# Patient Record
Sex: FEMALE
[De-identification: names, ages, dates, MRNs, and addresses within clinical notes are randomized per-mention and may not be internally consistent; named-entity substitution may affect disease eponyms.]

---

## 2022-03-15 ENCOUNTER — TRANSCRIPTION ENCOUNTER (OUTPATIENT)
Age: 37
End: 2022-03-15

## 2022-07-07 ENCOUNTER — NON-APPOINTMENT (OUTPATIENT)
Age: 37
End: 2022-07-07

## 2022-07-14 ENCOUNTER — EMERGENCY (EMERGENCY)
Facility: HOSPITAL | Age: 37
LOS: 1 days | Discharge: HOME | End: 2022-07-14
Attending: EMERGENCY MEDICINE
Payer: COMMERCIAL

## 2022-07-14 VITALS
TEMPERATURE: 98 F | RESPIRATION RATE: 18 BRPM | OXYGEN SATURATION: 99 % | WEIGHT: 220.02 LBS | SYSTOLIC BLOOD PRESSURE: 112 MMHG | HEART RATE: 86 BPM | DIASTOLIC BLOOD PRESSURE: 80 MMHG

## 2022-07-14 VITALS
HEART RATE: 87 BPM | TEMPERATURE: 98 F | RESPIRATION RATE: 17 BRPM | DIASTOLIC BLOOD PRESSURE: 69 MMHG | SYSTOLIC BLOOD PRESSURE: 101 MMHG | OXYGEN SATURATION: 99 %

## 2022-07-14 LAB
ALBUMIN SERPL ELPH-MCNC: 3.4 G/DL — LOW (ref 3.5–5)
ALP SERPL-CCNC: 102 U/L — SIGNIFICANT CHANGE UP (ref 40–120)
ALT FLD-CCNC: 43 U/L DA — SIGNIFICANT CHANGE UP (ref 10–60)
ANION GAP SERPL CALC-SCNC: 9 MMOL/L — SIGNIFICANT CHANGE UP (ref 5–17)
APPEARANCE UR: CLEAR — SIGNIFICANT CHANGE UP
APTT BLD: 33.9 SEC — SIGNIFICANT CHANGE UP (ref 27.5–35.5)
AST SERPL-CCNC: 44 U/L — HIGH (ref 10–40)
BASOPHILS # BLD AUTO: 0.05 K/UL — SIGNIFICANT CHANGE UP (ref 0–0.2)
BASOPHILS NFR BLD AUTO: 0.4 % — SIGNIFICANT CHANGE UP (ref 0–2)
BILIRUB SERPL-MCNC: 0.4 MG/DL — SIGNIFICANT CHANGE UP (ref 0.2–1.2)
BILIRUB UR-MCNC: NEGATIVE — SIGNIFICANT CHANGE UP
BUN SERPL-MCNC: 12 MG/DL — SIGNIFICANT CHANGE UP (ref 7–18)
CALCIUM SERPL-MCNC: 9 MG/DL — SIGNIFICANT CHANGE UP (ref 8.4–10.5)
CHLORIDE SERPL-SCNC: 106 MMOL/L — SIGNIFICANT CHANGE UP (ref 96–108)
CO2 SERPL-SCNC: 22 MMOL/L — SIGNIFICANT CHANGE UP (ref 22–31)
COLOR SPEC: YELLOW — SIGNIFICANT CHANGE UP
CREAT SERPL-MCNC: 0.77 MG/DL — SIGNIFICANT CHANGE UP (ref 0.5–1.3)
DIFF PNL FLD: NEGATIVE — SIGNIFICANT CHANGE UP
EGFR: 101 ML/MIN/1.73M2 — SIGNIFICANT CHANGE UP
EOSINOPHIL # BLD AUTO: 0.84 K/UL — HIGH (ref 0–0.5)
EOSINOPHIL NFR BLD AUTO: 7 % — HIGH (ref 0–6)
GLUCOSE SERPL-MCNC: 111 MG/DL — HIGH (ref 70–99)
GLUCOSE UR QL: NEGATIVE — SIGNIFICANT CHANGE UP
HCG SERPL-ACNC: <1 MIU/ML — SIGNIFICANT CHANGE UP
HCG UR QL: NEGATIVE — SIGNIFICANT CHANGE UP
HCT VFR BLD CALC: 40.4 % — SIGNIFICANT CHANGE UP (ref 34.5–45)
HGB BLD-MCNC: 13.5 G/DL — SIGNIFICANT CHANGE UP (ref 11.5–15.5)
IMM GRANULOCYTES NFR BLD AUTO: 0.4 % — SIGNIFICANT CHANGE UP (ref 0–1.5)
INR BLD: 1.02 RATIO — SIGNIFICANT CHANGE UP (ref 0.88–1.16)
KETONES UR-MCNC: NEGATIVE — SIGNIFICANT CHANGE UP
LEUKOCYTE ESTERASE UR-ACNC: ABNORMAL
LIDOCAIN IGE QN: 118 U/L — SIGNIFICANT CHANGE UP (ref 73–393)
LYMPHOCYTES # BLD AUTO: 16.6 % — SIGNIFICANT CHANGE UP (ref 13–44)
LYMPHOCYTES # BLD AUTO: 2 K/UL — SIGNIFICANT CHANGE UP (ref 1–3.3)
MCHC RBC-ENTMCNC: 28 PG — SIGNIFICANT CHANGE UP (ref 27–34)
MCHC RBC-ENTMCNC: 33.4 GM/DL — SIGNIFICANT CHANGE UP (ref 32–36)
MCV RBC AUTO: 83.8 FL — SIGNIFICANT CHANGE UP (ref 80–100)
MONOCYTES # BLD AUTO: 0.67 K/UL — SIGNIFICANT CHANGE UP (ref 0–0.9)
MONOCYTES NFR BLD AUTO: 5.6 % — SIGNIFICANT CHANGE UP (ref 2–14)
NEUTROPHILS # BLD AUTO: 8.43 K/UL — HIGH (ref 1.8–7.4)
NEUTROPHILS NFR BLD AUTO: 70 % — SIGNIFICANT CHANGE UP (ref 43–77)
NITRITE UR-MCNC: NEGATIVE — SIGNIFICANT CHANGE UP
NRBC # BLD: 0 /100 WBCS — SIGNIFICANT CHANGE UP (ref 0–0)
PH UR: 6 — SIGNIFICANT CHANGE UP (ref 5–8)
PLATELET # BLD AUTO: 236 K/UL — SIGNIFICANT CHANGE UP (ref 150–400)
POTASSIUM SERPL-MCNC: 4.5 MMOL/L — SIGNIFICANT CHANGE UP (ref 3.5–5.3)
POTASSIUM SERPL-SCNC: 4.5 MMOL/L — SIGNIFICANT CHANGE UP (ref 3.5–5.3)
PROT SERPL-MCNC: 7.7 G/DL — SIGNIFICANT CHANGE UP (ref 6–8.3)
PROT UR-MCNC: 30 MG/DL
PROTHROM AB SERPL-ACNC: 12.1 SEC — SIGNIFICANT CHANGE UP (ref 10.5–13.4)
RBC # BLD: 4.82 M/UL — SIGNIFICANT CHANGE UP (ref 3.8–5.2)
RBC # FLD: 14 % — SIGNIFICANT CHANGE UP (ref 10.3–14.5)
SODIUM SERPL-SCNC: 137 MMOL/L — SIGNIFICANT CHANGE UP (ref 135–145)
SP GR SPEC: 1.02 — SIGNIFICANT CHANGE UP (ref 1.01–1.02)
UROBILINOGEN FLD QL: NEGATIVE — SIGNIFICANT CHANGE UP
WBC # BLD: 12.04 K/UL — HIGH (ref 3.8–10.5)
WBC # FLD AUTO: 12.04 K/UL — HIGH (ref 3.8–10.5)

## 2022-07-14 PROCEDURE — 81001 URINALYSIS AUTO W/SCOPE: CPT

## 2022-07-14 PROCEDURE — 87086 URINE CULTURE/COLONY COUNT: CPT

## 2022-07-14 PROCEDURE — 85730 THROMBOPLASTIN TIME PARTIAL: CPT

## 2022-07-14 PROCEDURE — 84702 CHORIONIC GONADOTROPIN TEST: CPT

## 2022-07-14 PROCEDURE — 80053 COMPREHEN METABOLIC PANEL: CPT

## 2022-07-14 PROCEDURE — 85610 PROTHROMBIN TIME: CPT

## 2022-07-14 PROCEDURE — 96374 THER/PROPH/DIAG INJ IV PUSH: CPT

## 2022-07-14 PROCEDURE — 81025 URINE PREGNANCY TEST: CPT

## 2022-07-14 PROCEDURE — 83690 ASSAY OF LIPASE: CPT

## 2022-07-14 PROCEDURE — 85025 COMPLETE CBC W/AUTO DIFF WBC: CPT

## 2022-07-14 PROCEDURE — 99284 EMERGENCY DEPT VISIT MOD MDM: CPT | Mod: 25

## 2022-07-14 PROCEDURE — 99284 EMERGENCY DEPT VISIT MOD MDM: CPT

## 2022-07-14 PROCEDURE — 36415 COLL VENOUS BLD VENIPUNCTURE: CPT

## 2022-07-14 RX ORDER — LEVOFLOXACIN 5 MG/ML
1 INJECTION, SOLUTION INTRAVENOUS
Qty: 5 | Refills: 0
Start: 2022-07-14 | End: 2022-07-18

## 2022-07-14 RX ORDER — KETOROLAC TROMETHAMINE 30 MG/ML
30 SYRINGE (ML) INJECTION ONCE
Refills: 0 | Status: DISCONTINUED | OUTPATIENT
Start: 2022-07-14 | End: 2022-07-14

## 2022-07-14 RX ADMIN — Medication 30 MILLIGRAM(S): at 10:40

## 2022-07-14 RX ADMIN — Medication 30 MILLIGRAM(S): at 10:13

## 2022-07-14 NOTE — ED PROVIDER NOTE - OBJECTIVE STATEMENT
A 37 year old female with no pertinent PHMx presents to the ED with complaints of left flank pain from last night. Patient states she had pain in the right abdominal pain a few days ago. She states when she went to Urgent Care, she was told her urine was not normal. Patient was not given any pain medications. Patient denies any nausea, vomiting, diarrhea, urinary frequency and dysuria.   Allergies: Penicillins (Unknown)

## 2022-07-14 NOTE — ED ADULT NURSE NOTE - MUSCULOSKELETAL ASSESSMENT
CM CALLED TO ASSESS AND PLACE PT TO POSS STR  PT FELL AND HAS A WB FRACTURE, CAN NOT BE HOME ALONE  CM SPOKE WITH PT, SHE IS UNABLE TO GO HOME ALONE, IS WILLING TO GO TO STR AND POSS APPLY FOR MEDICAID IF NEEDED  CALL MADE TO [facility], THEY HAVE NO BED BUT WILLING TO ACCEPT THE REFERRAL  CALL MADE TO [facility], WILL REVIEW THE REQUEST AND ASSESS FOR BED AVAILABILITY  [facility]OUGH [facility], AWAITING REPLY  REFERRALS PLACED TO [facility] AND [facility] WITH PERMISSION OF THE PATIENT  KAL UNABLE TO PROVIDED A BED AT THIS TIME  [facility] HAS A BED AVAILABLE AT THE [facility]  PT WILL NEED TO APPLY FOR MEDICAID WHILE  [address]  PT IS AWARE OF THIS AND IS AGREEABLE IN ORDER TO CONTINUE TO RECEIVE CARE FOR THIS FRACTURE AND STRENGTHENING  CALL MADE TO THE PT DTR, EXPLAINED TO HER THE CURRENT DCP TO [facility]  EXPLAINED TO HER THAT SHE WILL NEED TO ASSIST HER MOM IN SIGNING INTO THE FACILITY TONIGHT AND THAT SHE WILL ALSO NEED TO ASSIST IN THE APPLICATION PROCESS FOR MEDICAID  DTR STATED THAT SHE IS AT WORK AND CAN STOP AT HER MOM'S HOUSE TONIGHT AND THEN COME TO THE FACILITY TO SIGN HER IN AT 6PM   WILL HAVE THE BUSINESS OFFICE CONTACT HER TO LET HER KNOW WHAT THEY WILL NEED FROM THE PT HOME   SCHEDULED WITH [facility] FOR WC  AT 2PM, GOING TO [facility] FOR STR  CM CALLED PT DTR, AWARE OF THE  TIME AND PROVIDED CONTACT INFORMATION FOR THE [facility] 
- - -

## 2022-07-14 NOTE — ED PROVIDER NOTE - NSFOLLOWUPINSTRUCTIONS_ED_ALL_ED_FT
Urinary Tract Infection in Women    WHAT YOU NEED TO KNOW:    A urinary tract infection (UTI) is caused by bacteria that get inside your urinary tract. Your urinary tract includes your kidneys, ureters, bladder, and urethra. A UTI is more common in your lower urinary tract, which includes your bladder and urethra.  Female Urinary System         DISCHARGE INSTRUCTIONS:    Seek care immediately if:   •You are urinating very little or not at all.      •You have a high fever with shaking chills.      •You have side or back pain that gets worse.      Call your doctor if:   •You have a fever.      •You do not feel better after 2 days of taking antibiotics.      •You have new symptoms, such as blood or pus in your urine.      •You are vomiting.      •You have questions or concerns about your condition or care.      Medicines:   •Antibiotics treat a bacterial infection. If you have UTIs often (called recurrent UTIs), you may be given antibiotics to take regularly. You will be given directions for when and how to use antibiotics. The goal is to prevent UTIs but not cause antibiotic resistance by using antibiotics too often.      •Medicines may be given to decrease pain and burning when you urinate. They will also help decrease the feeling that you need to urinate often. These medicines may make your urine orange or red.      •Take your medicine as directed. Contact your healthcare provider if you think your medicine is not helping or if you have side effects. Tell him or her if you are allergic to any medicine. Keep a list of the medicines, vitamins, and herbs you take. Include the amounts, and when and why you take them. Bring the list or the pill bottles to follow-up visits. Carry your medicine list with you in case of an emergency.      Follow up with your doctor as directed: Write down your questions so you remember to ask them during your visits.    Prevent another UTI:   •Empty your bladder often. Urinate and empty your bladder as soon as you feel the need. Do not hold your urine for long periods of time.      •Wipe from front to back after you urinate or have a bowel movement. This will help prevent germs from getting into your urinary tract through your urethra.      •Drink liquids as directed. Ask how much liquid to drink each day and which liquids are best for you. You may need to drink more liquids than usual to help flush out the bacteria. Do not drink alcohol, caffeine, or citrus juices. These can irritate your bladder and increase your symptoms. Your healthcare provider may recommend cranberry juice to help prevent a UTI.      •Urinate before and after you have sex. This can help flush out bacteria passed during sex.      •Do not douche or use feminine deodorants. These can change the chemical balance in your vagina.      •Change sanitary pads or tampons often. This will help prevent germs from getting into your urinary tract.       •Talk to your healthcare provider about your birth control method. You may need to change your method if it is increasing your risk for UTIs.      •Wear cotton underwear and clothes that are loose. Tight pants and nylon underwear can trap moisture and cause bacteria to grow.      •Vaginal estrogen may be recommended. This medicine helps prevent UTIs in women who have gone through menopause or are in alexy-menopause.      •Do pelvic muscle exercises often. Pelvic muscle exercises may help you start and stop urinating. Strong pelvic muscles may help you empty your bladder easier. Squeeze these muscles tightly for 5 seconds like you are trying to hold back urine. Then relax for 5 seconds. Gradually work up to squeezing for 10 seconds. Do 3 sets of 15 repetitions a day, or as directed.

## 2022-07-14 NOTE — ED ADULT NURSE NOTE - ISOLATION TYPE:
Primary Nurse Ricky Bull RN and Devyn Pabon RN performed a dual skin assessment on this patient Impairment noted- see wound doc flow sheet, left groin surgical incision site - dressing clean, dry, and intact  Miguel score is 16 None

## 2022-07-14 NOTE — ED PROVIDER NOTE - PATIENT PORTAL LINK FT
You can access the FollowMyHealth Patient Portal offered by [facility] by registering at the following website: [email or web address]. By joining [facility]’s FollowMyHealth portal, you will also be able to view your health information using other applications (apps) compatible with our system.

## 2022-07-14 NOTE — ED ADULT NURSE NOTE - OBJECTIVE STATEMENT
Pt c/o left back pain x couple of days, refuses  Symptoms/falls/injury. No acute distress noted, denies chest pain, no shortness of breath indicated.

## 2022-07-16 LAB
CULTURE RESULTS: SIGNIFICANT CHANGE UP
SPECIMEN SOURCE: SIGNIFICANT CHANGE UP

## 2022-10-18 PROBLEM — Z00.00 ENCOUNTER FOR PREVENTIVE HEALTH EXAMINATION: Status: ACTIVE | Noted: 2022-10-18

## 2022-10-25 ENCOUNTER — APPOINTMENT (OUTPATIENT)
Dept: OBGYN | Facility: CLINIC | Age: 37
End: 2022-10-25

## 2022-10-25 VITALS
BODY MASS INDEX: 45.16 KG/M2 | DIASTOLIC BLOOD PRESSURE: 77 MMHG | HEIGHT: 60 IN | SYSTOLIC BLOOD PRESSURE: 122 MMHG | WEIGHT: 230 LBS

## 2022-10-25 DIAGNOSIS — Z33.1 PREGNANT STATE, INCIDENTAL: ICD-10-CM

## 2022-10-25 DIAGNOSIS — O24.419 GESTATIONAL DIABETES MELLITUS IN PREGNANCY, UNSPECIFIED CONTROL: ICD-10-CM

## 2022-10-25 PROCEDURE — 99202 OFFICE O/P NEW SF 15 MIN: CPT

## 2022-10-25 NOTE — DISCUSSION/SUMMARY
[FreeTextEntry1] : -Discussed a possible abnormal NIPT due to vanishing twin\par -Advised pt to take baby ASA one day and two the next and continue to alternate\par -Recommended 24hr hr urine \par -GDM teaching done today by RN\par -RTO 3-4 weeks for GDM teaching \par

## 2022-10-25 NOTE — HISTORY OF PRESENT ILLNESS
[FreeTextEntry1] : 37 year old female presents for a consultation as she is high risk. LMP 7/18/22. Pt has hashimotos and early diagnosis of GDM. She is currently on Synthroid 175mcg daily. She is also currently on baby ASA once a day. She also states a vanishing twin in this pregnancy. She had NIPT done and still awaiting results. Her age related risk for chromosomal abnormalities is 1:80. The patient reports an unremarkable maternal and paternal family history and her pregnancy history is also noted to be unremarkable. \par

## 2022-11-30 ENCOUNTER — LABORATORY RESULT (OUTPATIENT)
Age: 37
End: 2022-11-30

## 2022-11-30 ENCOUNTER — APPOINTMENT (OUTPATIENT)
Dept: OBGYN | Facility: CLINIC | Age: 37
End: 2022-11-30
Payer: COMMERCIAL

## 2022-11-30 ENCOUNTER — APPOINTMENT (OUTPATIENT)
Dept: ANTEPARTUM | Facility: CLINIC | Age: 37
End: 2022-11-30

## 2022-11-30 VITALS — DIASTOLIC BLOOD PRESSURE: 82 MMHG | SYSTOLIC BLOOD PRESSURE: 130 MMHG | WEIGHT: 231 LBS | BODY MASS INDEX: 45.11 KG/M2

## 2022-11-30 DIAGNOSIS — O28.3 ABNORMAL ULTRASONIC FINDING ON ANTENATAL SCREENING OF MOTHER: ICD-10-CM

## 2022-11-30 PROCEDURE — 76820 UMBILICAL ARTERY ECHO: CPT

## 2022-11-30 PROCEDURE — 59000 AMNIOCENTESIS DIAGNOSTIC: CPT

## 2022-11-30 PROCEDURE — 76811 OB US DETAILED SNGL FETUS: CPT

## 2022-11-30 PROCEDURE — 99213 OFFICE O/P EST LOW 20 MIN: CPT

## 2022-11-30 PROCEDURE — 76946 ECHO GUIDE FOR AMNIOCENTESIS: CPT

## 2022-11-30 PROCEDURE — 76817 TRANSVAGINAL US OBSTETRIC: CPT

## 2022-12-01 ENCOUNTER — TRANSCRIPTION ENCOUNTER (OUTPATIENT)
Age: 37
End: 2022-12-01

## 2022-12-08 ENCOUNTER — OUTPATIENT (OUTPATIENT)
Dept: OUTPATIENT SERVICES | Age: 37
LOS: 1 days | Discharge: HOME | End: 2022-12-08

## 2022-12-09 ENCOUNTER — APPOINTMENT (OUTPATIENT)
Dept: PEDIATRIC CARDIOLOGY | Facility: CLINIC | Age: 37
End: 2022-12-09

## 2022-12-09 PROCEDURE — 76820 UMBILICAL ARTERY ECHO: CPT

## 2022-12-09 PROCEDURE — 76825 ECHO EXAM OF FETAL HEART: CPT

## 2022-12-09 PROCEDURE — 76821 MIDDLE CEREBRAL ARTERY ECHO: CPT

## 2022-12-09 PROCEDURE — 99202 OFFICE O/P NEW SF 15 MIN: CPT | Mod: 25

## 2022-12-09 PROCEDURE — 93325 DOPPLER ECHO COLOR FLOW MAPG: CPT | Mod: 59

## 2022-12-09 PROCEDURE — 76827 ECHO EXAM OF FETAL HEART: CPT

## 2022-12-14 ENCOUNTER — APPOINTMENT (OUTPATIENT)
Dept: OBGYN | Facility: CLINIC | Age: 37
End: 2022-12-14

## 2022-12-14 ENCOUNTER — APPOINTMENT (OUTPATIENT)
Dept: ANTEPARTUM | Facility: CLINIC | Age: 37
End: 2022-12-14

## 2022-12-14 VITALS — BODY MASS INDEX: 45.31 KG/M2 | SYSTOLIC BLOOD PRESSURE: 124 MMHG | WEIGHT: 232 LBS | DIASTOLIC BLOOD PRESSURE: 83 MMHG

## 2022-12-14 PROCEDURE — 76820 UMBILICAL ARTERY ECHO: CPT | Mod: 59

## 2022-12-14 PROCEDURE — 76816 OB US FOLLOW-UP PER FETUS: CPT

## 2022-12-14 PROCEDURE — ZZZZZ: CPT

## 2022-12-14 PROCEDURE — 76821 MIDDLE CEREBRAL ARTERY ECHO: CPT | Mod: 59

## 2022-12-14 NOTE — OB SUMMARY
[FreeTextEntry2] : DANIEL 4/24/23. \par Amnio done on 11/30/22 [Date: _____] : Date: [unfilled]  [Gestational Age: _____] : Gestational Age: [unfilled]  [FreeTextEntry1] : Anatomy scan done today. Sono done reveals fetus not growing, 2 week lag in growth. Placenta previa also noted. Discussed evidence of early onset severe IUGR. \par -recommended fetal echo (req given today)\par -advised pt to see endo \par -offered amnio which pt desires (amnio done today, see official procedure note)\par -f/u APLS BW req given today \par -pt to RTO 2 weeks for EFW  [FreeTextEntry9] : EFW done today. Severe onset IUGR- Wt 9 oz. +FHR. By measurements, approx 18w4d today. Dopplers done today. Absent end diastolic flow noted (see official sono report). Discussed amnio which was WNL. \par -discussed daily monitoring at hospital start of 24 weeks \par -RTO 2 weeks for EFW

## 2022-12-22 ENCOUNTER — TRANSCRIPTION ENCOUNTER (OUTPATIENT)
Age: 37
End: 2022-12-22

## 2022-12-22 ENCOUNTER — APPOINTMENT (OUTPATIENT)
Dept: ANTEPARTUM | Facility: CLINIC | Age: 37
End: 2022-12-22

## 2022-12-22 ENCOUNTER — ASOB RESULT (OUTPATIENT)
Age: 37
End: 2022-12-22

## 2022-12-22 PROCEDURE — 76811 OB US DETAILED SNGL FETUS: CPT

## 2022-12-22 PROCEDURE — 76817 TRANSVAGINAL US OBSTETRIC: CPT

## 2022-12-23 DIAGNOSIS — Z11.59 ENCOUNTER FOR SCREENING FOR OTHER VIRAL DISEASES: ICD-10-CM

## 2022-12-25 ENCOUNTER — NON-APPOINTMENT (OUTPATIENT)
Age: 37
End: 2022-12-25

## 2022-12-27 ENCOUNTER — TRANSCRIPTION ENCOUNTER (OUTPATIENT)
Age: 37
End: 2022-12-27

## 2022-12-27 ENCOUNTER — NON-APPOINTMENT (OUTPATIENT)
Age: 37
End: 2022-12-27

## 2022-12-27 ENCOUNTER — APPOINTMENT (OUTPATIENT)
Dept: OBGYN | Facility: CLINIC | Age: 37
End: 2022-12-27

## 2022-12-27 ENCOUNTER — OUTPATIENT (OUTPATIENT)
Dept: OUTPATIENT SERVICES | Facility: HOSPITAL | Age: 37
LOS: 1 days | End: 2022-12-27

## 2022-12-27 VITALS
DIASTOLIC BLOOD PRESSURE: 80 MMHG | OXYGEN SATURATION: 98 % | RESPIRATION RATE: 16 BRPM | TEMPERATURE: 98 F | SYSTOLIC BLOOD PRESSURE: 117 MMHG | HEART RATE: 73 BPM | HEIGHT: 60 IN | WEIGHT: 224.87 LBS

## 2022-12-27 VITALS
SYSTOLIC BLOOD PRESSURE: 124 MMHG | WEIGHT: 228 LBS | HEIGHT: 60 IN | BODY MASS INDEX: 44.76 KG/M2 | DIASTOLIC BLOOD PRESSURE: 84 MMHG

## 2022-12-27 DIAGNOSIS — E03.9 HYPOTHYROIDISM, UNSPECIFIED: ICD-10-CM

## 2022-12-27 DIAGNOSIS — O35.9XX0 MATERNAL CARE FOR (SUSPECTED) FETAL ABNORMALITY AND DAMAGE, UNSPECIFIED, NOT APPLICABLE OR UNSPECIFIED: ICD-10-CM

## 2022-12-27 DIAGNOSIS — Z98.890 OTHER SPECIFIED POSTPROCEDURAL STATES: Chronic | ICD-10-CM

## 2022-12-27 DIAGNOSIS — O35.8XX0 MATERNAL CARE FOR OTHER (SUSPECTED) FETAL ABNORMALITY AND DAMAGE, NOT APPLICABLE OR UNSPECIFIED: ICD-10-CM

## 2022-12-27 DIAGNOSIS — Z71.9 COUNSELING, UNSPECIFIED: ICD-10-CM

## 2022-12-27 DIAGNOSIS — Z90.49 ACQUIRED ABSENCE OF OTHER SPECIFIED PARTS OF DIGESTIVE TRACT: Chronic | ICD-10-CM

## 2022-12-27 DIAGNOSIS — E66.9 OBESITY, UNSPECIFIED: ICD-10-CM

## 2022-12-27 LAB
ANION GAP SERPL CALC-SCNC: 15 MMOL/L — HIGH (ref 7–14)
BLD GP AB SCN SERPL QL: NEGATIVE — SIGNIFICANT CHANGE UP
BUN SERPL-MCNC: 9 MG/DL — SIGNIFICANT CHANGE UP (ref 7–23)
CALCIUM SERPL-MCNC: 10.1 MG/DL — SIGNIFICANT CHANGE UP (ref 8.4–10.5)
CHLORIDE SERPL-SCNC: 102 MMOL/L — SIGNIFICANT CHANGE UP (ref 98–107)
CO2 SERPL-SCNC: 20 MMOL/L — LOW (ref 22–31)
CREAT SERPL-MCNC: 0.51 MG/DL — SIGNIFICANT CHANGE UP (ref 0.5–1.3)
EGFR: 122 ML/MIN/1.73M2 — SIGNIFICANT CHANGE UP
GLUCOSE SERPL-MCNC: 87 MG/DL — SIGNIFICANT CHANGE UP (ref 70–99)
HCT VFR BLD CALC: 35.8 % — SIGNIFICANT CHANGE UP (ref 34.5–45)
HGB BLD-MCNC: 11.9 G/DL — SIGNIFICANT CHANGE UP (ref 11.5–15.5)
MCHC RBC-ENTMCNC: 28.1 PG — SIGNIFICANT CHANGE UP (ref 27–34)
MCHC RBC-ENTMCNC: 33.2 GM/DL — SIGNIFICANT CHANGE UP (ref 32–36)
MCV RBC AUTO: 84.4 FL — SIGNIFICANT CHANGE UP (ref 80–100)
NRBC # BLD: 0 /100 WBCS — SIGNIFICANT CHANGE UP (ref 0–0)
NRBC # FLD: 0 K/UL — SIGNIFICANT CHANGE UP (ref 0–0)
PLATELET # BLD AUTO: 239 K/UL — SIGNIFICANT CHANGE UP (ref 150–400)
POTASSIUM SERPL-MCNC: 3.6 MMOL/L — SIGNIFICANT CHANGE UP (ref 3.5–5.3)
POTASSIUM SERPL-SCNC: 3.6 MMOL/L — SIGNIFICANT CHANGE UP (ref 3.5–5.3)
RBC # BLD: 4.24 M/UL — SIGNIFICANT CHANGE UP (ref 3.8–5.2)
RBC # FLD: 14.4 % — SIGNIFICANT CHANGE UP (ref 10.3–14.5)
RH IG SCN BLD-IMP: POSITIVE — SIGNIFICANT CHANGE UP
SODIUM SERPL-SCNC: 137 MMOL/L — SIGNIFICANT CHANGE UP (ref 135–145)
WBC # BLD: 10.16 K/UL — SIGNIFICANT CHANGE UP (ref 3.8–10.5)
WBC # FLD AUTO: 10.16 K/UL — SIGNIFICANT CHANGE UP (ref 3.8–10.5)

## 2022-12-27 PROCEDURE — S0190: CPT

## 2022-12-27 PROCEDURE — 76801 OB US < 14 WKS SINGLE FETUS: CPT

## 2022-12-27 PROCEDURE — 99204 OFFICE O/P NEW MOD 45 MIN: CPT | Mod: 25

## 2022-12-27 PROCEDURE — 59200 INSERT CERVICAL DILATOR: CPT

## 2022-12-27 RX ORDER — SODIUM CHLORIDE 9 MG/ML
1000 INJECTION, SOLUTION INTRAVENOUS
Refills: 0 | Status: DISCONTINUED | OUTPATIENT
Start: 2022-12-28 | End: 2023-01-11

## 2022-12-27 RX ORDER — IBUPROFEN 600 MG/1
600 TABLET ORAL
Qty: 20 | Refills: 0 | Status: ACTIVE | COMMUNITY
Start: 2022-12-27 | End: 1900-01-01

## 2022-12-27 RX ORDER — SODIUM CHLORIDE 9 MG/ML
3 INJECTION INTRAMUSCULAR; INTRAVENOUS; SUBCUTANEOUS EVERY 8 HOURS
Refills: 0 | Status: DISCONTINUED | OUTPATIENT
Start: 2022-12-28 | End: 2023-01-11

## 2022-12-27 NOTE — H&P PST ADULT - COMMENTS
Activity: walks dog daily, climbs stairs   METS: 5  Symptoms: denies loose teeth Cardiovascular Expenditure   Activity: walks dog daily, climbs stairs   METS: 5  Symptoms: None    Dentition: denies loose teeth or dentures

## 2022-12-27 NOTE — H&P PST ADULT - PROBLEM SELECTOR PLAN 1
preop for dilation and evacuation with ultrasound guidance on 12/28/22  preop instructions given, pt verbalized understanding  cbc, bmp, type pending  pt states preop COVID testing was done 12/26/22 preop for dilation and evacuation with ultrasound guidance on 12/28/22  preop instructions given, pt verbalized understanding  STAT labs cbc, bmp, type pending  pt states preop COVID testing was done 12/26/22  case discussed with Dr. Mohr

## 2022-12-27 NOTE — H&P PST ADULT - FEMALE-SPECIFIC SYMPTOMS
pt reports dilators inserted 1 hour. she is currently experiencing pelvic cramping/pelvic pain pt says dilators inserted 1 hour. she is currently experiencing pelvic cramping/pelvic pain

## 2022-12-27 NOTE — H&P PST ADULT - NSICDXPASTMEDICALHX_GEN_ALL_CORE_FT
PAST MEDICAL HISTORY:  Hypothyroidism     Maternal care for other suspected fetal abnormality or damage     Obesity     Organ donor

## 2022-12-27 NOTE — H&P PST ADULT - NSICDXFAMILYHX_GEN_ALL_CORE_FT
FAMILY HISTORY:  Father  Still living? Unknown  FH: cirrhosis, Age at diagnosis: Age Unknown    Mother  Still living? Unknown  FH: ovarian cancer, Age at diagnosis: Age Unknown

## 2022-12-27 NOTE — H&P PST ADULT - ENDOCRINE COMMENTS
h/o hypothyroidism- on synthroid daily. Pt states last dose change was 4 months ago. Pt states recent TSH 4 weeks ago WNL h/o hypothyroidism- on synthroid daily. Pt states last dose change was 4 months ago. She states recent TSH 4 weeks ago was WNL

## 2022-12-27 NOTE — H&P PST ADULT - PATIENT ON (OXYGEN DELIVERY METHOD)
I personally evaluated the patient. I reviewed the Resident’s or Physician Assistant’s note (as assigned above), and agree with the findings and plan except as documented in my note.
room air

## 2022-12-27 NOTE — PROCEDURE
[Transabdominal OB Sonogram] : Transabdominal OB Sonogram [Intrauterine Pregnancy] : intrauterine pregnancy [Yolk Sac] : yolk sac present [Fetal Heart] : fetal heart present [Current GA by Sonogram: ___ (wks)] : Current GA by Sonogram: [unfilled]Uwks [___ day(s)] : [unfilled] days [FreeTextEntry1] : Anterior fundal placenta\par BPD  4.85cm  20w5d\par HC  19.19cm  21w3d\par AC  16.33cm  21w3d\par FL  2.88cm  18w6d

## 2022-12-27 NOTE — H&P PST ADULT - ALLERGIC/IMMUNOLOGIC
NEW VISIT    Patient: Hannah Marion  ?  YOB: 1975    MRN: [id number]  ?  Chief Complaint   Patient presents with   • Right Knee - Pain   • Left Knee - Pain   • Establish Care      ?  HPI:   44 y.o. female presents with complaint of bilateral knee pain for the last 2 months.  She denies any specific injury to either knee.  She reports the left knee is more painful than the right.  Her history is remarkable for 2 meniscal surgeries on the right knee.  She is also previously received steroid injections by her primary care provider prior to that surgery.  She reports she is worked at [employer] for the last 30 years which has involved a lot of standing on concrete.    Pain Location: bilateral knee  Radiation: Into the medial aspect  Quality: aching, burning  Intensity/Severity: severe  Duration: 2 months with progressive worsening  Onset quality: gradual   Timing: constant  Aggravating Factors: going up and down stairs, sitting, rising after sitting, walking, standing  Alleviating Factors: Tylenol, steroid injection (intra-articular), ice  Previous Episodes: yes  Associated Symptoms: pain, swelling, clicking/popping  ADLs Affected: grooming/hygiene/toileting/personal care, ambulating, work related activities, recreational activities/sports    This patient is a new patient.  This problem is new to this examiner.      Allergies:   Allergies   Allergen Reactions   • Levaquin [Levofloxacin] Hives       Medications:   Home Medications:  Current Outpatient Medications on File Prior to Visit   Medication Sig   • amLODIPine (NORVASC) 10 MG tablet Take 10 mg by mouth Daily.   • atenolol-chlorthalidone (TENORETIC) 100-25 MG per tablet Take 1 tablet by mouth Daily.   • busPIRone (BUSPAR) 15 MG tablet Take 15 mg by mouth 2 (Two) Times a Day.   • cefdinir (OMNICEF) 300 MG capsule Take 300 mg by mouth 2 (Two) Times a Day.   • desvenlafaxine (PRISTIQ) 100 MG 24 hr tablet Take 100 mg by mouth Every Morning.  "  • fluconazole (DIFLUCAN) 150 MG tablet Take 150 mg by mouth Daily.   • folic acid (FOLVITE) 1 MG tablet Take 1,000 mcg by mouth Daily.   • gabapentin (NEURONTIN) 300 MG capsule Take 300 mg by mouth 3 (Three) Times a Day.   • GUAIATUSSIN -10 MG/5ML liquid TAKE 5 ML BY MOUTH EVERY 4 TO 6 HOURS   • levothyroxine (SYNTHROID, LEVOTHROID) 150 MCG tablet Take 150 mcg by mouth Daily.   • pantoprazole (PROTONIX) 40 MG EC tablet Take 40 mg by mouth Daily.   • TRI-SPRINTEC 0.18/0.215/0.25 MG-35 MCG per tablet Take 1 tablet by mouth Daily.     No current facility-administered medications on file prior to visit.      Current Medications:  Scheduled Meds:  PRN Meds:.    I have reviewed the patient's medical history in detail and updated the computerized patient record.  Review and summarization of old records include:    Past Medical History:   Diagnosis Date   • Hypothyroidism      Past Surgical History:   Procedure Laterality Date   • GALLBLADDER SURGERY     • KNEE SURGERY     • THYROIDECTOMY       Social History     Occupational History   • Not on file   Tobacco Use   • Smoking status: Never Smoker   • Smokeless tobacco: Never Used   Substance and Sexual Activity   • Alcohol use: Not on file   • Drug use: Never   • Sexual activity: Defer     Family History   Problem Relation Age of Onset   • Rheumatologic disease Mother    • Osteoarthritis Father          Review of Systems  Constitutional: Negative.  Negative for fever.   Eyes: Negative.    Respiratory: Negative.    Cardiovascular: Negative.    Endocrine: Negative.    Musculoskeletal: Positive for arthralgias, gait problem and joint swelling.   Skin: Negative.  Negative for rash and wound.   Allergic/Immunologic: Negative.    Neurological: Negative for numbness.   Hematological: Negative.    Psychiatric/Behavioral: Negative.         Wt Readings from Last 3 Encounters:   02/10/20 103 kg (226 lb 6.4 oz)     Ht Readings from Last 3 Encounters:   02/10/20 147.3 cm (58\") "     Body mass index is 47.32 kg/m².  Facility age limit for growth percentiles is 20 years.  Vitals:    02/10/20 1031   Temp: 98.3 °F (36.8 °C)         Physical Exam  Constitutional: Patient is oriented to person, place, and time. Appears well-developed and well-nourished.   HENT:   Head: Normocephalic and atraumatic.   Eyes: Conjunctivae and EOM are normal. Pupils are equal, round, and reactive to light.   Cardiovascular: Normal rate and intact distal pulses.   Pulmonary/Chest: Effort normal and breath sounds normal.   Musculoskeletal:   See detailed exam below   Neurological: Alert and oriented to person, place, and time. No sensory deficit. Coordination normal.   Skin: Skin is warm and dry. Capillary refill takes less than 2 seconds. No rash noted. No erythema.   Psychiatric: Patient has a normal mood and affect. Her behavior is normal. Judgment and thought content normal.   Nursing note and vitals reviewed.      Ortho Exam: Bilateral knees:  Positive patellar grind test with pain in the retropatellar region bilaterally.    Positive for high Q-angle with patella tracking laterally in high grades of flexion.   Skin and soft tissues are swollen, consistent with inflammatory changes of the patellofemoral joint.    Positive for significant tenderness over the medial patellofemoral ligaments.   Quadriceps mechanism is well preserved.   Range of motion-Extension to Flexion: 0-120 degrees.  Slightly positive for apprehension sign laterally.  Negative anterior and posterior drawer. No medial or lateral instability noted.   Dorsalis pedis and posterior tibial artery pulses are palpable. Common peroneal nerve function is well preserved.        Diagnostics:  XR - 1 Result   I have personally reviewed   Results for orders placed in visit on 02/10/20   XR KNEE 3 VW BILATERAL 2/10/2020    and my interpretation of the image is as follows:   Findings: Mild narrowing of the medial joint space and patellofemoral joint space.   There is evidence of subchondral sclerosis at the medial aspect bilaterally.  There is a laterally tracking patella bilaterally.  Bony lesion: no  Soft tissues: within normal limits  Joint spaces: decreased  Hardware appropriately positioned: not applicable  Prior studies available for comparison: no          Assessment:  Hannah was seen today for establish care, pain and pain.    Diagnoses and all orders for this visit:    Pain in both knees, unspecified chronicity  -     XR Knee 3 View Bilateral  -     CBC & Differential; Future  -     C-reactive Protein; Future  -     Rheumatoid Factor; Future  -     Sedimentation Rate; Future  -     Uric Acid; Future  -     Urinalysis With Microscopic - Urine, Clean Catch  -     Cancel: Sjogren's Antibody, Anti-SS-A / -SS-B; Future  -     Cancel: Nuclear Antigen Antibody, IFA; Future  -     Cancel: Anti-Smith Antibody; Future  -     Cancel: RNP Antibodies; Future  -     Cancel: Anti-DNA Antibody, Double-stranded; Future  -     Anti-DNA Antibody, Double-stranded; Future  -     Nuclear Antigen Antibody, IFA; Future  -     Anti-Smith Antibody; Future  -     RNP Antibodies; Future  -     Sjogren's Antibody, Anti-SS-A / -SS-B; Future  -     Comprehensive Metabolic Panel; Future    Patellofemoral arthralgia of both knees    Bilateral hand pain  -     CBC & Differential; Future  -     C-reactive Protein; Future  -     Rheumatoid Factor; Future  -     Sedimentation Rate; Future  -     Uric Acid; Future  -     Urinalysis With Microscopic - Urine, Clean Catch  -     Cancel: Sjogren's Antibody, Anti-SS-A / -SS-B; Future  -     Cancel: Nuclear Antigen Antibody, IFA; Future  -     Cancel: Anti-Smith Antibody; Future  -     Cancel: RNP Antibodies; Future  -     Cancel: Anti-DNA Antibody, Double-stranded; Future  -     Anti-DNA Antibody, Double-stranded; Future  -     Nuclear Antigen Antibody, IFA; Future  -     Anti-Smith Antibody; Future  -     RNP Antibodies; Future  -     Sjogren's  Antibody, Anti-SS-A / -SS-B; Future  -     Comprehensive Metabolic Panel; Future    Polyarthralgia  -     CBC & Differential; Future  -     C-reactive Protein; Future  -     Rheumatoid Factor; Future  -     Sedimentation Rate; Future  -     Uric Acid; Future  -     Urinalysis With Microscopic - Urine, Clean Catch  -     Cancel: Sjogren's Antibody, Anti-SS-A / -SS-B; Future  -     Cancel: Nuclear Antigen Antibody, IFA; Future  -     Cancel: Anti-Smith Antibody; Future  -     Cancel: RNP Antibodies; Future  -     Cancel: Anti-DNA Antibody, Double-stranded; Future  -     Anti-DNA Antibody, Double-stranded; Future  -     Nuclear Antigen Antibody, IFA; Future  -     Anti-Smith Antibody; Future  -     RNP Antibodies; Future  -     Sjogren's Antibody, Anti-SS-A / -SS-B; Future  -     Comprehensive Metabolic Panel; Future    Joint stiffness  -     CBC & Differential; Future  -     C-reactive Protein; Future  -     Rheumatoid Factor; Future  -     Sedimentation Rate; Future  -     Uric Acid; Future  -     Urinalysis With Microscopic - Urine, Clean Catch  -     Cancel: Sjogren's Antibody, Anti-SS-A / -SS-B; Future  -     Cancel: Nuclear Antigen Antibody, IFA; Future  -     Cancel: Anti-Smith Antibody; Future  -     Cancel: RNP Antibodies; Future  -     Cancel: Anti-DNA Antibody, Double-stranded; Future  -     Anti-DNA Antibody, Double-stranded; Future  -     Nuclear Antigen Antibody, IFA; Future  -     Anti-Smith Antibody; Future  -     RNP Antibodies; Future  -     Sjogren's Antibody, Anti-SS-A / -SS-B; Future  -     Comprehensive Metabolic Panel; Future    Other orders  -     Large Joint Arthrocentesis  -     Large Joint Arthrocentesis            Large Joint Arthrocentesis: R knee  Date/Time: 2/10/2020 11:00 AM  Consent given by: patient  Site marked: site marked  Timeout: Immediately prior to procedure a time out was called to verify the correct patient, procedure, equipment, support staff and site/side marked as required    Supporting Documentation  Indications: pain   Procedure Details  Location: knee - R knee  Preparation: Patient was prepped and draped in the usual sterile fashion  Needle size: 25 G  Approach: anteromedial  Medications administered: 80 mg methylPREDNISolone acetate 80 MG/ML; 2 mL lidocaine PF 1% 1 %  Patient tolerance: patient tolerated the procedure well with no immediate complications    Large Joint Arthrocentesis: L knee  Date/Time: 2/10/2020 11:00 AM  Consent given by: patient  Site marked: site marked  Timeout: Immediately prior to procedure a time out was called to verify the correct patient, procedure, equipment, support staff and site/side marked as required   Supporting Documentation  Indications: pain   Procedure Details  Location: knee - L knee  Preparation: Patient was prepped and draped in the usual sterile fashion  Needle size: 25 G  Approach: anteromedial  Medications administered: 80 mg methylPREDNISolone acetate 80 MG/ML; 2 mL lidocaine PF 1% 1 %  Patient tolerance: patient tolerated the procedure well with no immediate complications          ?    Plan    · Discussion between the patient and myself regarding her current symptoms, physical exam findings and imaging results.  The following treatment options were discussed:  · Intra-articular injection, patient would like to proceed with injections bilaterally. Intraarticular injection with steroid performed today in clinic given persistent pain and lack of response to conservative measures.  · Physical Therapy: I have recommended physical therapy to work on motion and strengthening.  She will work with the physical therapist at her place of employment.  · Discussed use of patellar tracking brace, which was fitted and given while in office.  · Discussed ordering an MRI but patient prefers to try more conservative treatment initially.  · Rest, ice, compression, and elevation (RICE) therapy  · Alternate OTC Ibuprofen and Tylenol as needed/if clinically  indicated  · Follow up in 4-6 weeks      Amos Quijano PA-C  Date of encounter: 02/10/2020     Electronically signed by Amos Quijano PA-C, 02/10/20, 1:05 PM.   details…

## 2022-12-27 NOTE — H&P PST ADULT - ASSESSMENT
38 y/o female who is 23 weeks pregnant presents to PST preop for dilation and evacuation with ultrasound guidance. Pt reports severe early onset IUGR, thick placenta and fetal doppler abnormalities.

## 2022-12-27 NOTE — ASU PATIENT PROFILE, ADULT - NSICDXPASTMEDICALHX_GEN_ALL_CORE_FT
PAST MEDICAL HISTORY:  Hypothyroidism     Maternal care for other suspected fetal abnormality or damage     Obesity     Organ donor liver

## 2022-12-27 NOTE — ASU PATIENT PROFILE, ADULT - FALL HARM RISK - UNIVERSAL INTERVENTIONS
Bed in lowest position, wheels locked, appropriate side rails in place/Call bell, personal items and telephone in reach/Instruct patient to call for assistance before getting out of bed or chair/Non-slip footwear when patient is out of bed/[place] to call system/Physically safe environment - no spills, clutter or unnecessary equipment/Purposeful Proactive Rounding/Room/bathroom lighting operational, light cord in reach

## 2022-12-27 NOTE — HISTORY OF PRESENT ILLNESS
[FreeTextEntry1] : 36 y/o  @23w1d (LMP 22) presents for consultation for termination of pregnancy due to severe early onset IUGR. \par \par Pt was seen for anatomy scan on  and was found to be lagging in size by 13 days, which is consistent with early onset growth restriction. Additionally, a bulky and thick placenta, placental surface hematoma and doppler abnormalities (absent UAD) were noted.Absent end diastolic flow in the umbilical artery and reversed A wave on ductus venosum. Pt is s/p genetic testing and amniocentesis on  and FISH results are normal.\par \par POB:\par : miscarriage at 8 wks\par \par PGYN: abnormal pap X1 4 years ago but normal since\par \par Occupation: Manager in [employer]\par \par Patient is vaccinated against COVID-19. \par \par \par

## 2022-12-27 NOTE — H&P PST ADULT - FEMALE REPRODUCTIVE COMMENT, PROFILE
preop dx of maternal care for feta abnormality. see HPI preop dx of maternal care for fetal abnormality. see HPI

## 2022-12-27 NOTE — H&P PST ADULT - MUSCULOSKELETAL
negative no calf tenderness/normal gait/strength 5/5 bilateral upper extremities/strength 5/5 bilateral lower extremities/back exam

## 2022-12-27 NOTE — H&P PST ADULT - HISTORY OF PRESENT ILLNESS
36 y/o female who is 23 weeks pregnant presents to PST preop for dilation and evacuation with ultrasound guidance. Pt reports severe early onset IUGR and thick placenta and doppler abnormalities.   36 y/o female who is 23 weeks pregnant presents to PST preop for dilation and evacuation with ultrasound guidance. Pt reports severe early onset IUGR, thick placenta and fetal doppler abnormalities.

## 2022-12-28 ENCOUNTER — RESULT REVIEW (OUTPATIENT)
Age: 37
End: 2022-12-28

## 2022-12-28 ENCOUNTER — OUTPATIENT (OUTPATIENT)
Dept: OUTPATIENT SERVICES | Facility: HOSPITAL | Age: 37
LOS: 1 days | Discharge: HOME | End: 2022-12-28
Payer: COMMERCIAL

## 2022-12-28 ENCOUNTER — APPOINTMENT (OUTPATIENT)
Dept: OBGYN | Facility: CLINIC | Age: 37
End: 2022-12-28

## 2022-12-28 ENCOUNTER — TRANSCRIPTION ENCOUNTER (OUTPATIENT)
Age: 37
End: 2022-12-28

## 2022-12-28 VITALS
OXYGEN SATURATION: 100 % | HEART RATE: 93 BPM | TEMPERATURE: 99 F | RESPIRATION RATE: 18 BRPM | HEIGHT: 60 IN | WEIGHT: 224.87 LBS | DIASTOLIC BLOOD PRESSURE: 77 MMHG | SYSTOLIC BLOOD PRESSURE: 112 MMHG

## 2022-12-28 VITALS
SYSTOLIC BLOOD PRESSURE: 110 MMHG | TEMPERATURE: 99 F | OXYGEN SATURATION: 99 % | DIASTOLIC BLOOD PRESSURE: 60 MMHG | RESPIRATION RATE: 17 BRPM | HEART RATE: 99 BPM

## 2022-12-28 DIAGNOSIS — Z98.890 OTHER SPECIFIED POSTPROCEDURAL STATES: Chronic | ICD-10-CM

## 2022-12-28 DIAGNOSIS — Z90.49 ACQUIRED ABSENCE OF OTHER SPECIFIED PARTS OF DIGESTIVE TRACT: Chronic | ICD-10-CM

## 2022-12-28 DIAGNOSIS — O35.9XX0 MATERNAL CARE FOR (SUSPECTED) FETAL ABNORMALITY AND DAMAGE, UNSPECIFIED, NOT APPLICABLE OR UNSPECIFIED: ICD-10-CM

## 2022-12-28 LAB
C TRACH RRNA SPEC QL NAA+PROBE: NOT DETECTED
N GONORRHOEA RRNA SPEC QL NAA+PROBE: NOT DETECTED
SOURCE AMPLIFICATION: NORMAL

## 2022-12-28 PROCEDURE — 76998 US GUIDE INTRAOP: CPT | Mod: 26

## 2022-12-28 PROCEDURE — 88304 TISSUE EXAM BY PATHOLOGIST: CPT | Mod: 26

## 2022-12-28 PROCEDURE — 59841 INDUCED ABORTION DILAT&EVAC: CPT | Mod: 22

## 2022-12-28 RX ORDER — KETOROLAC TROMETHAMINE 30 MG/ML
30 SYRINGE (ML) INJECTION ONCE
Refills: 0 | Status: DISCONTINUED | OUTPATIENT
Start: 2022-12-28 | End: 2022-12-28

## 2022-12-28 RX ORDER — OXYCODONE HYDROCHLORIDE 5 MG/1
5 TABLET ORAL ONCE
Refills: 0 | Status: DISCONTINUED | OUTPATIENT
Start: 2022-12-28 | End: 2022-12-28

## 2022-12-28 RX ADMIN — SODIUM CHLORIDE 30 MILLILITER(S): 9 INJECTION, SOLUTION INTRAVENOUS at 07:35

## 2022-12-28 RX ADMIN — OXYCODONE HYDROCHLORIDE 5 MILLIGRAM(S): 5 TABLET ORAL at 09:40

## 2022-12-28 RX ADMIN — OXYCODONE HYDROCHLORIDE 5 MILLIGRAM(S): 5 TABLET ORAL at 09:13

## 2022-12-28 RX ADMIN — Medication 30 MILLIGRAM(S): at 09:10

## 2022-12-28 RX ADMIN — Medication 30 MILLIGRAM(S): at 09:40

## 2022-12-28 RX ADMIN — SODIUM CHLORIDE 3 MILLILITER(S): 9 INJECTION INTRAMUSCULAR; INTRAVENOUS; SUBCUTANEOUS at 07:35

## 2022-12-28 NOTE — ASU DISCHARGE PLAN (ADULT/PEDIATRIC) - NS MD DC FALL RISK RISK
For information on Fall & Injury Prevention, visit: [email or web address] OR  [email or web address] OR  https://www.cdc.gov/steadi/patient.html

## 2022-12-28 NOTE — BRIEF OPERATIVE NOTE - NSICDXBRIEFPROCEDURE_GEN_ALL_CORE_FT
PROCEDURES:  Exam under anesthesia, pelvic 28-Dec-2022 08:27:13  Soraida Stern  Dilation and evacuation of uterus using suction with ultrasound guidance 28-Dec-2022 08:27:24  Soraida Stern

## 2022-12-28 NOTE — ASU DISCHARGE PLAN (ADULT/PEDIATRIC) - PAIN MANAGEMENT
Take over the counter pain medication You received IV Tylenol for pain management at 8 AM. Please DO NOT take any Tylenol (Acetaminophen) containing products, such as Vicodin, Percocet, Excedrin, and cold medications for the next 6 hours (until 2 PM). DO NOT TAKE MORE THAN 4000 MG OF TYLENOL in a 24 hour period./Take over the counter pain medication You received IV Tylenol for pain management at 8 AM. Please DO NOT take any Tylenol (Acetaminophen) containing products, such as Vicodin, Percocet, Excedrin, and cold medications for the next 6 hours (until 2 PM). DO NOT TAKE MORE THAN 4000 MG OF TYLENOL in a 24 hour period. You received IV Toradol for pain management at 9 AM. Please DO NOT take Motrin/Ibuprofen/Advil/Aleve/NSAIDs (Non-Steroidal Anti-Inflammatory Drugs) for the next 6 hours (until 3 PM)./Take over the counter pain medication

## 2022-12-28 NOTE — BRIEF OPERATIVE NOTE - OPERATION/FINDINGS
anteverted uterus 22 week size, cervix 2cm dilated, pelvic exam and procedure very difficult due to body habitus  D&E performed under direct ultrasound guidance   Fetal parts all accounted for and appropriate for gestational age   Thin endometrial stripe noted at the end of the procedure   30 units of pitocin given into the IV fluid prophylactically  0.2mg of methergine given IM at the end of the case

## 2022-12-28 NOTE — ASU DISCHARGE PLAN (ADULT/PEDIATRIC) - CARE PROVIDER_API CALL
Soraida Stern)  OBSGYN  General  [address], [address]  [place] [address]  Phone: [phone]  Fax: [phone]  Follow Up Time:

## 2022-12-29 ENCOUNTER — APPOINTMENT (OUTPATIENT)
Dept: ANTEPARTUM | Facility: CLINIC | Age: 37
End: 2022-12-29

## 2022-12-29 ENCOUNTER — NON-APPOINTMENT (OUTPATIENT)
Age: 37
End: 2022-12-29

## 2022-12-29 ENCOUNTER — APPOINTMENT (OUTPATIENT)
Dept: OBGYN | Facility: CLINIC | Age: 37
End: 2022-12-29

## 2023-01-06 LAB — SURGICAL PATHOLOGY STUDY: SIGNIFICANT CHANGE UP

## 2023-02-10 NOTE — ED PROVIDER NOTE - CARDIAC, MLM
Normal vision: sees adequately in most situations; can see medication labels, newsprint
Normal rate, regular rhythm.  Heart sounds S1, S2.  No murmurs, rubs or gallops.

## 2023-04-19 ENCOUNTER — NON-APPOINTMENT (OUTPATIENT)
Age: 38
End: 2023-04-19

## 2023-04-21 ENCOUNTER — APPOINTMENT (OUTPATIENT)
Dept: MATERNAL FETAL MEDICINE | Facility: CLINIC | Age: 38
End: 2023-04-21
Payer: COMMERCIAL

## 2023-04-21 ENCOUNTER — ASOB RESULT (OUTPATIENT)
Age: 38
End: 2023-04-21

## 2023-04-21 PROCEDURE — G0109 DIAB MANAGE TRN IND/GROUP: CPT | Mod: 95

## 2023-04-28 ENCOUNTER — ASOB RESULT (OUTPATIENT)
Age: 38
End: 2023-04-28

## 2023-04-28 ENCOUNTER — LABORATORY RESULT (OUTPATIENT)
Age: 38
End: 2023-04-28

## 2023-04-28 ENCOUNTER — APPOINTMENT (OUTPATIENT)
Dept: MATERNAL FETAL MEDICINE | Facility: CLINIC | Age: 38
End: 2023-04-28
Payer: COMMERCIAL

## 2023-04-28 ENCOUNTER — APPOINTMENT (OUTPATIENT)
Dept: ANTEPARTUM | Facility: CLINIC | Age: 38
End: 2023-04-28
Payer: COMMERCIAL

## 2023-04-28 PROCEDURE — G0108 DIAB MANAGE TRN  PER INDIV: CPT | Mod: 95

## 2023-04-28 PROCEDURE — 76813 OB US NUCHAL MEAS 1 GEST: CPT

## 2023-04-28 PROCEDURE — 36415 COLL VENOUS BLD VENIPUNCTURE: CPT

## 2023-04-28 PROCEDURE — 76801 OB US < 14 WKS SINGLE FETUS: CPT

## 2023-05-15 ENCOUNTER — ASOB RESULT (OUTPATIENT)
Age: 38
End: 2023-05-15

## 2023-05-15 ENCOUNTER — APPOINTMENT (OUTPATIENT)
Dept: MATERNAL FETAL MEDICINE | Facility: CLINIC | Age: 38
End: 2023-05-15
Payer: COMMERCIAL

## 2023-05-15 PROCEDURE — G0108 DIAB MANAGE TRN  PER INDIV: CPT | Mod: 95

## 2023-05-17 ENCOUNTER — APPOINTMENT (OUTPATIENT)
Dept: MATERNAL FETAL MEDICINE | Facility: CLINIC | Age: 38
End: 2023-05-17

## 2023-05-17 ENCOUNTER — APPOINTMENT (OUTPATIENT)
Dept: ANTEPARTUM | Facility: CLINIC | Age: 38
End: 2023-05-17

## 2023-05-18 ENCOUNTER — ASOB RESULT (OUTPATIENT)
Age: 38
End: 2023-05-18

## 2023-05-18 ENCOUNTER — APPOINTMENT (OUTPATIENT)
Dept: MATERNAL FETAL MEDICINE | Facility: CLINIC | Age: 38
End: 2023-05-18
Payer: COMMERCIAL

## 2023-05-18 PROCEDURE — 99205 OFFICE O/P NEW HI 60 MIN: CPT | Mod: 95

## 2023-05-26 ENCOUNTER — NON-APPOINTMENT (OUTPATIENT)
Age: 38
End: 2023-05-26

## 2023-05-31 ENCOUNTER — APPOINTMENT (OUTPATIENT)
Dept: ANTEPARTUM | Facility: CLINIC | Age: 38
End: 2023-05-31
Payer: COMMERCIAL

## 2023-05-31 ENCOUNTER — ASOB RESULT (OUTPATIENT)
Age: 38
End: 2023-05-31

## 2023-05-31 PROCEDURE — 76805 OB US >/= 14 WKS SNGL FETUS: CPT

## 2023-06-05 ENCOUNTER — APPOINTMENT (OUTPATIENT)
Dept: MATERNAL FETAL MEDICINE | Facility: CLINIC | Age: 38
End: 2023-06-05
Payer: COMMERCIAL

## 2023-06-05 ENCOUNTER — ASOB RESULT (OUTPATIENT)
Age: 38
End: 2023-06-05

## 2023-06-05 PROCEDURE — G0108 DIAB MANAGE TRN  PER INDIV: CPT | Mod: 95

## 2023-06-27 ENCOUNTER — ASOB RESULT (OUTPATIENT)
Age: 38
End: 2023-06-27

## 2023-06-27 ENCOUNTER — APPOINTMENT (OUTPATIENT)
Dept: MATERNAL FETAL MEDICINE | Facility: CLINIC | Age: 38
End: 2023-06-27
Payer: COMMERCIAL

## 2023-06-27 PROCEDURE — G0108 DIAB MANAGE TRN  PER INDIV: CPT | Mod: 95

## 2023-06-30 ENCOUNTER — ASOB RESULT (OUTPATIENT)
Age: 38
End: 2023-06-30

## 2023-06-30 ENCOUNTER — APPOINTMENT (OUTPATIENT)
Dept: ANTEPARTUM | Facility: CLINIC | Age: 38
End: 2023-06-30
Payer: COMMERCIAL

## 2023-06-30 PROCEDURE — 76811 OB US DETAILED SNGL FETUS: CPT

## 2023-06-30 PROCEDURE — 99212 OFFICE O/P EST SF 10 MIN: CPT | Mod: 25

## 2023-07-05 ENCOUNTER — APPOINTMENT (OUTPATIENT)
Dept: PEDIATRIC CARDIOLOGY | Facility: CLINIC | Age: 38
End: 2023-07-05
Payer: COMMERCIAL

## 2023-07-05 PROCEDURE — 76821 MIDDLE CEREBRAL ARTERY ECHO: CPT

## 2023-07-05 PROCEDURE — 99203 OFFICE O/P NEW LOW 30 MIN: CPT

## 2023-07-05 PROCEDURE — 76825 ECHO EXAM OF FETAL HEART: CPT

## 2023-07-05 PROCEDURE — 76820 UMBILICAL ARTERY ECHO: CPT

## 2023-07-05 PROCEDURE — 76827 ECHO EXAM OF FETAL HEART: CPT

## 2023-07-05 PROCEDURE — 93325 DOPPLER ECHO COLOR FLOW MAPG: CPT | Mod: 59

## 2023-07-12 ENCOUNTER — APPOINTMENT (OUTPATIENT)
Dept: ANTEPARTUM | Facility: CLINIC | Age: 38
End: 2023-07-12

## 2023-07-18 ENCOUNTER — ASOB RESULT (OUTPATIENT)
Age: 38
End: 2023-07-18

## 2023-07-18 ENCOUNTER — APPOINTMENT (OUTPATIENT)
Dept: MATERNAL FETAL MEDICINE | Facility: CLINIC | Age: 38
End: 2023-07-18
Payer: COMMERCIAL

## 2023-07-18 PROCEDURE — G0108 DIAB MANAGE TRN  PER INDIV: CPT | Mod: 95

## 2023-07-28 ENCOUNTER — APPOINTMENT (OUTPATIENT)
Dept: ANTEPARTUM | Facility: CLINIC | Age: 38
End: 2023-07-28
Payer: COMMERCIAL

## 2023-07-28 ENCOUNTER — ASOB RESULT (OUTPATIENT)
Age: 38
End: 2023-07-28

## 2023-07-28 PROCEDURE — 76816 OB US FOLLOW-UP PER FETUS: CPT

## 2023-08-08 ENCOUNTER — NON-APPOINTMENT (OUTPATIENT)
Age: 38
End: 2023-08-08

## 2023-08-08 ENCOUNTER — APPOINTMENT (OUTPATIENT)
Dept: MATERNAL FETAL MEDICINE | Facility: CLINIC | Age: 38
End: 2023-08-08

## 2023-08-25 ENCOUNTER — APPOINTMENT (OUTPATIENT)
Dept: ANTEPARTUM | Facility: CLINIC | Age: 38
End: 2023-08-25
Payer: COMMERCIAL

## 2023-08-25 ENCOUNTER — ASOB RESULT (OUTPATIENT)
Age: 38
End: 2023-08-25

## 2023-08-25 PROCEDURE — 76816 OB US FOLLOW-UP PER FETUS: CPT

## 2023-08-25 PROCEDURE — 76819 FETAL BIOPHYS PROFIL W/O NST: CPT

## 2023-09-22 ENCOUNTER — ASOB RESULT (OUTPATIENT)
Age: 38
End: 2023-09-22

## 2023-09-22 ENCOUNTER — APPOINTMENT (OUTPATIENT)
Dept: ANTEPARTUM | Facility: CLINIC | Age: 38
End: 2023-09-22
Payer: COMMERCIAL

## 2023-09-22 PROCEDURE — 76818 FETAL BIOPHYS PROFILE W/NST: CPT | Mod: 59

## 2023-09-22 PROCEDURE — 76816 OB US FOLLOW-UP PER FETUS: CPT

## 2023-09-29 ENCOUNTER — APPOINTMENT (OUTPATIENT)
Dept: ANTEPARTUM | Facility: CLINIC | Age: 38
End: 2023-09-29

## 2023-10-06 ENCOUNTER — APPOINTMENT (OUTPATIENT)
Dept: ANTEPARTUM | Facility: CLINIC | Age: 38
End: 2023-10-06

## 2023-10-13 ENCOUNTER — APPOINTMENT (OUTPATIENT)
Dept: ANTEPARTUM | Facility: CLINIC | Age: 38
End: 2023-10-13

## 2023-10-20 ENCOUNTER — ASOB RESULT (OUTPATIENT)
Age: 38
End: 2023-10-20

## 2023-10-20 ENCOUNTER — APPOINTMENT (OUTPATIENT)
Dept: ANTEPARTUM | Facility: CLINIC | Age: 38
End: 2023-10-20
Payer: COMMERCIAL

## 2023-10-20 PROCEDURE — 76818 FETAL BIOPHYS PROFILE W/NST: CPT | Mod: 59

## 2023-10-20 PROCEDURE — 76816 OB US FOLLOW-UP PER FETUS: CPT

## 2023-10-27 ENCOUNTER — APPOINTMENT (OUTPATIENT)
Dept: ANTEPARTUM | Facility: CLINIC | Age: 38
End: 2023-10-27
Payer: COMMERCIAL

## 2023-10-27 ENCOUNTER — ASOB RESULT (OUTPATIENT)
Age: 38
End: 2023-10-27

## 2023-10-27 PROCEDURE — 76818 FETAL BIOPHYS PROFILE W/NST: CPT

## 2023-10-30 ENCOUNTER — APPOINTMENT (OUTPATIENT)
Dept: MATERNAL FETAL MEDICINE | Facility: CLINIC | Age: 38
End: 2023-10-30
Payer: COMMERCIAL

## 2023-10-30 ENCOUNTER — ASOB RESULT (OUTPATIENT)
Age: 38
End: 2023-10-30

## 2023-10-30 PROCEDURE — G0108 DIAB MANAGE TRN  PER INDIV: CPT

## 2023-10-31 ENCOUNTER — APPOINTMENT (OUTPATIENT)
Dept: ANTEPARTUM | Facility: CLINIC | Age: 38
End: 2023-10-31

## 2023-10-31 ENCOUNTER — OUTPATIENT (OUTPATIENT)
Dept: INPATIENT UNIT | Facility: HOSPITAL | Age: 38
LOS: 1 days | Discharge: HOME | End: 2023-10-31
Payer: COMMERCIAL

## 2023-10-31 VITALS — SYSTOLIC BLOOD PRESSURE: 101 MMHG | HEART RATE: 69 BPM | DIASTOLIC BLOOD PRESSURE: 74 MMHG

## 2023-10-31 VITALS
HEART RATE: 85 BPM | SYSTOLIC BLOOD PRESSURE: 136 MMHG | DIASTOLIC BLOOD PRESSURE: 80 MMHG | TEMPERATURE: 99 F | RESPIRATION RATE: 16 BRPM

## 2023-10-31 DIAGNOSIS — O26.899 OTHER SPECIFIED PREGNANCY RELATED CONDITIONS, UNSPECIFIED TRIMESTER: ICD-10-CM

## 2023-10-31 DIAGNOSIS — Z90.49 ACQUIRED ABSENCE OF OTHER SPECIFIED PARTS OF DIGESTIVE TRACT: Chronic | ICD-10-CM

## 2023-10-31 DIAGNOSIS — Z98.890 OTHER SPECIFIED POSTPROCEDURAL STATES: Chronic | ICD-10-CM

## 2023-10-31 LAB
ALBUMIN SERPL ELPH-MCNC: 3.7 G/DL — SIGNIFICANT CHANGE UP (ref 3.3–5)
ALBUMIN SERPL ELPH-MCNC: 3.7 G/DL — SIGNIFICANT CHANGE UP (ref 3.3–5)
ALP SERPL-CCNC: 172 U/L — HIGH (ref 40–120)
ALP SERPL-CCNC: 172 U/L — HIGH (ref 40–120)
ALT FLD-CCNC: 9 U/L — SIGNIFICANT CHANGE UP (ref 4–33)
ALT FLD-CCNC: 9 U/L — SIGNIFICANT CHANGE UP (ref 4–33)
ANION GAP SERPL CALC-SCNC: 11 MMOL/L — SIGNIFICANT CHANGE UP (ref 7–14)
ANION GAP SERPL CALC-SCNC: 11 MMOL/L — SIGNIFICANT CHANGE UP (ref 7–14)
APPEARANCE UR: ABNORMAL
APPEARANCE UR: ABNORMAL
APTT BLD: 30.9 SEC — SIGNIFICANT CHANGE UP (ref 24.5–35.6)
APTT BLD: 30.9 SEC — SIGNIFICANT CHANGE UP (ref 24.5–35.6)
AST SERPL-CCNC: 14 U/L — SIGNIFICANT CHANGE UP (ref 4–32)
AST SERPL-CCNC: 14 U/L — SIGNIFICANT CHANGE UP (ref 4–32)
BACTERIA # UR AUTO: ABNORMAL /HPF
BACTERIA # UR AUTO: ABNORMAL /HPF
BASOPHILS # BLD AUTO: 0.04 K/UL — SIGNIFICANT CHANGE UP (ref 0–0.2)
BASOPHILS # BLD AUTO: 0.04 K/UL — SIGNIFICANT CHANGE UP (ref 0–0.2)
BASOPHILS NFR BLD AUTO: 0.3 % — SIGNIFICANT CHANGE UP (ref 0–2)
BASOPHILS NFR BLD AUTO: 0.3 % — SIGNIFICANT CHANGE UP (ref 0–2)
BILIRUB SERPL-MCNC: <0.2 MG/DL — SIGNIFICANT CHANGE UP (ref 0.2–1.2)
BILIRUB SERPL-MCNC: <0.2 MG/DL — SIGNIFICANT CHANGE UP (ref 0.2–1.2)
BILIRUB UR-MCNC: NEGATIVE — SIGNIFICANT CHANGE UP
BILIRUB UR-MCNC: NEGATIVE — SIGNIFICANT CHANGE UP
BUN SERPL-MCNC: 7 MG/DL — SIGNIFICANT CHANGE UP (ref 7–23)
BUN SERPL-MCNC: 7 MG/DL — SIGNIFICANT CHANGE UP (ref 7–23)
CALCIUM SERPL-MCNC: 9.4 MG/DL — SIGNIFICANT CHANGE UP (ref 8.4–10.5)
CALCIUM SERPL-MCNC: 9.4 MG/DL — SIGNIFICANT CHANGE UP (ref 8.4–10.5)
CAST: 0 /LPF — SIGNIFICANT CHANGE UP (ref 0–4)
CAST: 0 /LPF — SIGNIFICANT CHANGE UP (ref 0–4)
CHLORIDE SERPL-SCNC: 104 MMOL/L — SIGNIFICANT CHANGE UP (ref 98–107)
CHLORIDE SERPL-SCNC: 104 MMOL/L — SIGNIFICANT CHANGE UP (ref 98–107)
CO2 SERPL-SCNC: 18 MMOL/L — LOW (ref 22–31)
CO2 SERPL-SCNC: 18 MMOL/L — LOW (ref 22–31)
COLOR SPEC: YELLOW — SIGNIFICANT CHANGE UP
COLOR SPEC: YELLOW — SIGNIFICANT CHANGE UP
CREAT ?TM UR-MCNC: 97 MG/DL — SIGNIFICANT CHANGE UP
CREAT ?TM UR-MCNC: 97 MG/DL — SIGNIFICANT CHANGE UP
CREAT SERPL-MCNC: 0.53 MG/DL — SIGNIFICANT CHANGE UP (ref 0.5–1.3)
CREAT SERPL-MCNC: 0.53 MG/DL — SIGNIFICANT CHANGE UP (ref 0.5–1.3)
DIFF PNL FLD: ABNORMAL
DIFF PNL FLD: ABNORMAL
EGFR: 121 ML/MIN/1.73M2 — SIGNIFICANT CHANGE UP
EGFR: 121 ML/MIN/1.73M2 — SIGNIFICANT CHANGE UP
EOSINOPHIL # BLD AUTO: 0.41 K/UL — SIGNIFICANT CHANGE UP (ref 0–0.5)
EOSINOPHIL # BLD AUTO: 0.41 K/UL — SIGNIFICANT CHANGE UP (ref 0–0.5)
EOSINOPHIL NFR BLD AUTO: 3.6 % — SIGNIFICANT CHANGE UP (ref 0–6)
EOSINOPHIL NFR BLD AUTO: 3.6 % — SIGNIFICANT CHANGE UP (ref 0–6)
FIBRINOGEN PPP-MCNC: 555 MG/DL — HIGH (ref 200–465)
FIBRINOGEN PPP-MCNC: 555 MG/DL — HIGH (ref 200–465)
GLUCOSE SERPL-MCNC: 109 MG/DL — HIGH (ref 70–99)
GLUCOSE SERPL-MCNC: 109 MG/DL — HIGH (ref 70–99)
GLUCOSE UR QL: NEGATIVE MG/DL — SIGNIFICANT CHANGE UP
GLUCOSE UR QL: NEGATIVE MG/DL — SIGNIFICANT CHANGE UP
HCT VFR BLD CALC: 35.5 % — SIGNIFICANT CHANGE UP (ref 34.5–45)
HCT VFR BLD CALC: 35.5 % — SIGNIFICANT CHANGE UP (ref 34.5–45)
HGB BLD-MCNC: 12.2 G/DL — SIGNIFICANT CHANGE UP (ref 11.5–15.5)
HGB BLD-MCNC: 12.2 G/DL — SIGNIFICANT CHANGE UP (ref 11.5–15.5)
IANC: 8.18 K/UL — HIGH (ref 1.8–7.4)
IANC: 8.18 K/UL — HIGH (ref 1.8–7.4)
IMM GRANULOCYTES NFR BLD AUTO: 0.4 % — SIGNIFICANT CHANGE UP (ref 0–0.9)
IMM GRANULOCYTES NFR BLD AUTO: 0.4 % — SIGNIFICANT CHANGE UP (ref 0–0.9)
INR BLD: 0.93 RATIO — SIGNIFICANT CHANGE UP (ref 0.85–1.18)
INR BLD: 0.93 RATIO — SIGNIFICANT CHANGE UP (ref 0.85–1.18)
KETONES UR-MCNC: ABNORMAL MG/DL
KETONES UR-MCNC: ABNORMAL MG/DL
LDH SERPL L TO P-CCNC: 166 U/L — SIGNIFICANT CHANGE UP (ref 135–225)
LDH SERPL L TO P-CCNC: 166 U/L — SIGNIFICANT CHANGE UP (ref 135–225)
LEUKOCYTE ESTERASE UR-ACNC: ABNORMAL
LEUKOCYTE ESTERASE UR-ACNC: ABNORMAL
LYMPHOCYTES # BLD AUTO: 18.8 % — SIGNIFICANT CHANGE UP (ref 13–44)
LYMPHOCYTES # BLD AUTO: 18.8 % — SIGNIFICANT CHANGE UP (ref 13–44)
LYMPHOCYTES # BLD AUTO: 2.15 K/UL — SIGNIFICANT CHANGE UP (ref 1–3.3)
LYMPHOCYTES # BLD AUTO: 2.15 K/UL — SIGNIFICANT CHANGE UP (ref 1–3.3)
MCHC RBC-ENTMCNC: 27.9 PG — SIGNIFICANT CHANGE UP (ref 27–34)
MCHC RBC-ENTMCNC: 27.9 PG — SIGNIFICANT CHANGE UP (ref 27–34)
MCHC RBC-ENTMCNC: 34.4 GM/DL — SIGNIFICANT CHANGE UP (ref 32–36)
MCHC RBC-ENTMCNC: 34.4 GM/DL — SIGNIFICANT CHANGE UP (ref 32–36)
MCV RBC AUTO: 81.2 FL — SIGNIFICANT CHANGE UP (ref 80–100)
MCV RBC AUTO: 81.2 FL — SIGNIFICANT CHANGE UP (ref 80–100)
MONOCYTES # BLD AUTO: 0.62 K/UL — SIGNIFICANT CHANGE UP (ref 0–0.9)
MONOCYTES # BLD AUTO: 0.62 K/UL — SIGNIFICANT CHANGE UP (ref 0–0.9)
MONOCYTES NFR BLD AUTO: 5.4 % — SIGNIFICANT CHANGE UP (ref 2–14)
MONOCYTES NFR BLD AUTO: 5.4 % — SIGNIFICANT CHANGE UP (ref 2–14)
NEUTROPHILS # BLD AUTO: 8.18 K/UL — HIGH (ref 1.8–7.4)
NEUTROPHILS # BLD AUTO: 8.18 K/UL — HIGH (ref 1.8–7.4)
NEUTROPHILS NFR BLD AUTO: 71.5 % — SIGNIFICANT CHANGE UP (ref 43–77)
NEUTROPHILS NFR BLD AUTO: 71.5 % — SIGNIFICANT CHANGE UP (ref 43–77)
NITRITE UR-MCNC: NEGATIVE — SIGNIFICANT CHANGE UP
NITRITE UR-MCNC: NEGATIVE — SIGNIFICANT CHANGE UP
NRBC # BLD: 0 /100 WBCS — SIGNIFICANT CHANGE UP (ref 0–0)
NRBC # BLD: 0 /100 WBCS — SIGNIFICANT CHANGE UP (ref 0–0)
NRBC # FLD: 0 K/UL — SIGNIFICANT CHANGE UP (ref 0–0)
NRBC # FLD: 0 K/UL — SIGNIFICANT CHANGE UP (ref 0–0)
PH UR: 7 — SIGNIFICANT CHANGE UP (ref 5–8)
PH UR: 7 — SIGNIFICANT CHANGE UP (ref 5–8)
PLATELET # BLD AUTO: 237 K/UL — SIGNIFICANT CHANGE UP (ref 150–400)
PLATELET # BLD AUTO: 237 K/UL — SIGNIFICANT CHANGE UP (ref 150–400)
POTASSIUM SERPL-MCNC: 4.1 MMOL/L — SIGNIFICANT CHANGE UP (ref 3.5–5.3)
POTASSIUM SERPL-MCNC: 4.1 MMOL/L — SIGNIFICANT CHANGE UP (ref 3.5–5.3)
POTASSIUM SERPL-SCNC: 4.1 MMOL/L — SIGNIFICANT CHANGE UP (ref 3.5–5.3)
POTASSIUM SERPL-SCNC: 4.1 MMOL/L — SIGNIFICANT CHANGE UP (ref 3.5–5.3)
PROT ?TM UR-MCNC: 26 MG/DL — SIGNIFICANT CHANGE UP
PROT SERPL-MCNC: 7 G/DL — SIGNIFICANT CHANGE UP (ref 6–8.3)
PROT SERPL-MCNC: 7 G/DL — SIGNIFICANT CHANGE UP (ref 6–8.3)
PROT UR-MCNC: 30 MG/DL
PROT UR-MCNC: 30 MG/DL
PROT/CREAT UR-RTO: 0.3 RATIO — HIGH (ref 0–0.2)
PROT/CREAT UR-RTO: 0.3 RATIO — HIGH (ref 0–0.2)
PROTHROM AB SERPL-ACNC: 10.5 SEC — SIGNIFICANT CHANGE UP (ref 9.5–13)
PROTHROM AB SERPL-ACNC: 10.5 SEC — SIGNIFICANT CHANGE UP (ref 9.5–13)
RBC # BLD: 4.37 M/UL — SIGNIFICANT CHANGE UP (ref 3.8–5.2)
RBC # BLD: 4.37 M/UL — SIGNIFICANT CHANGE UP (ref 3.8–5.2)
RBC # FLD: 14.9 % — HIGH (ref 10.3–14.5)
RBC # FLD: 14.9 % — HIGH (ref 10.3–14.5)
RBC CASTS # UR COMP ASSIST: 8 /HPF — HIGH (ref 0–4)
RBC CASTS # UR COMP ASSIST: 8 /HPF — HIGH (ref 0–4)
SODIUM SERPL-SCNC: 133 MMOL/L — LOW (ref 135–145)
SODIUM SERPL-SCNC: 133 MMOL/L — LOW (ref 135–145)
SP GR SPEC: 1.02 — SIGNIFICANT CHANGE UP (ref 1–1.03)
SP GR SPEC: 1.02 — SIGNIFICANT CHANGE UP (ref 1–1.03)
SQUAMOUS # UR AUTO: 35 /HPF — HIGH (ref 0–5)
SQUAMOUS # UR AUTO: 35 /HPF — HIGH (ref 0–5)
URATE SERPL-MCNC: 5.1 MG/DL — SIGNIFICANT CHANGE UP (ref 2.5–7)
URATE SERPL-MCNC: 5.1 MG/DL — SIGNIFICANT CHANGE UP (ref 2.5–7)
UROBILINOGEN FLD QL: 0.2 MG/DL — SIGNIFICANT CHANGE UP (ref 0.2–1)
UROBILINOGEN FLD QL: 0.2 MG/DL — SIGNIFICANT CHANGE UP (ref 0.2–1)
WBC # BLD: 11.45 K/UL — HIGH (ref 3.8–10.5)
WBC # BLD: 11.45 K/UL — HIGH (ref 3.8–10.5)
WBC # FLD AUTO: 11.45 K/UL — HIGH (ref 3.8–10.5)
WBC # FLD AUTO: 11.45 K/UL — HIGH (ref 3.8–10.5)
WBC UR QL: 7 /HPF — HIGH (ref 0–5)
WBC UR QL: 7 /HPF — HIGH (ref 0–5)

## 2023-10-31 PROCEDURE — 99221 1ST HOSP IP/OBS SF/LOW 40: CPT | Mod: 25

## 2023-10-31 PROCEDURE — 59025 FETAL NON-STRESS TEST: CPT | Mod: 26

## 2023-10-31 NOTE — OB PROVIDER TRIAGE NOTE - NSOBPROVIDERNOTE_OBGYN_ALL_OB_FT
Ms. ERNST FIELDS is a 38y  @ 37w6d sent from office for elevated /92. Denies ha, visual disturbances, epigastric pain, cp, sob.   PNC: Dr. Ying. GDMA2. AMA.    Plan  - Rule out preeclampsia Ms. ERNST FIELDS is a 38y  @ 37w6d without evidence of preeclampsia however with PCR 0.3 so sending home with 24 hour urine.   PNC: Dr. Ying. GDMA2. AMA.    Plan  - Patient to be discharged home with follow up and return precautions  - Please follow up with the ATU at your next scheduled appointment on Friday and then follow up with Dr. Ying on Tuesday.   - Please collect the 24 hour urine bucket as instructed.  Tomorrow go to the bathroom in the morning and urinate into the toilet. Afterward, begin to collect the urine in the bucket for 24 hours. Please call Dr. Ying's office today so they can create a script for the specimen and you can send it out.   - Please return for decreased / no fetal movement, vaginal bleeding similar to that of a period, leaking / gush of fluid, regular contractions occurring 4-5 minutes  for one hour or requiring pain medication.  - Please return for headache unrelieved by Tylenol,  right-sided abdominal pain, blurry vision / flashing lights / visual disturbances.   - Take your blood pressure twice daily and call your provider for any readings greater than 140/90. Please return immediately for any blood pressures greater than 160/110.   - Patient and partner educated of plan and demonstrate understanding. All questions answered. Discharge instructions provided and signed.     Evaluation and plan d/w Dr. Allison

## 2023-10-31 NOTE — OB PROVIDER TRIAGE NOTE - NSHPPHYSICALEXAM_GEN_ALL_CORE
T(C): 37 (10-31-23 @ 10:37), Max: 37 (10-31-23 @ 10:37)  HR: 71 (10-31-23 @ 11:35) (67 - 85)  BP: 107/66 (10-31-23 @ 11:35) (107/66 - 136/80)  RR: 16 (10-31-23 @ 10:37) (16 - 16)  SpO2: --  General: Female sitting comfortably in no apparent distress.   Head: Normocephalic. Atraumatic.   Eyes: No discharge, lids normal, conjunctiva normal  Lungs: No resp distress  Abdomen: Soft, nontender. Gravid.   TAUS:  Sono saved in ASOB.   NST: Reactive. Category 1.   Neuro: No facial asymmetry, no slurred speech, moves all 4 extremities  Mood: Alert and lucid, appropriate mood and affect

## 2023-10-31 NOTE — OB PROVIDER TRIAGE NOTE - PATIENT'S GENDER IDENTITY
[facility] HOSPITALIST  Progress Note     [place] Patient Status:  Inpatient    11/3/1940 MRN [id number]   [facility] 4NW-A Attending Becki Jaquez MD   [address] Day [address] 2 PCP Rona Beasley MD     Chief Complaint: fall    S: Patient repo 1.1  --    TP 7.3 6.1*  --        Estimated Creatinine Clearance: 59.9 mL/min (based on SCr of 1.08 mg/dL).     Recent Labs   Lab 12/26/19  1248 12/27/19  0803   PTP 17.4* 17.1*   INR 1.35* 1.33*       No results for input(s): TROP, CK in the last 168 hours cleared by ID, HHC/commode per SW    Plan of care discussed with pt, RN. Up to chair if pt feeling up to it. Weaned off o2. Await ID final recs.      Kyara FOX  11:51 AM     Patient seen and examined    S- abdominal pain in epigastric area with Female

## 2023-10-31 NOTE — OB PROVIDER TRIAGE NOTE - HISTORY OF PRESENT ILLNESS
Ms. ERNST FIELDS is a 38y  @ 37w6d sent from office for elevated /92. Denies ha, visual disturbances, epigastric pain, cp, sob.   PNC: Dr. Ying. GDMA2. AMA.

## 2023-10-31 NOTE — OB PROVIDER TRIAGE NOTE - NSHPLABSRESULTS_GEN_ALL_CORE
12.2   11.45 )-----------( 237      ( 31 Oct 2023 11:00 )             35.5 12.2   11.45 )-----------( 237      ( 31 Oct 2023 11:00 )             35.5    10-31    133<L>  |  104  |  7   ----------------------------<  109<H>  4.1   |  18<L>  |  0.53    Ca    9.4      31 Oct 2023 11:00    TPro  7.0  /  Alb  3.7  /  TBili  <0.2  /  DBili  x   /  AST  14  /  ALT  9   /  AlkPhos  172<H>  10-31
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
## 2023-10-31 NOTE — OB PROVIDER TRIAGE NOTE - ADDITIONAL INSTRUCTIONS
- Please follow up with the ATU at your next scheduled appointment on Friday and then follow up with Dr. Ying on Tuesday.   - Please collect the 24 hour urine bucket as instructed.  Tomorrow go to the bathroom in the morning and urinate into the toilet. Afterward, begin to collect the urine in the bucket for 24 hours. Please call Dr. Ying's office today so they can create a script for the specimen and you can send it out.   - Please return for decreased / no fetal movement, vaginal bleeding similar to that of a period, leaking / gush of fluid, regular contractions occurring 4-5 minutes  for one hour or requiring pain medication.  - Please return for headache unrelieved by Tylenol,  right-sided abdominal pain, blurry vision / flashing lights / visual disturbances.   - Take your blood pressure twice daily and call your provider for any readings greater than 140/90. Please return immediately for any blood pressures greater than 160/110.

## 2023-11-01 DIAGNOSIS — O09.523 SUPERVISION OF ELDERLY MULTIGRAVIDA, THIRD TRIMESTER: ICD-10-CM

## 2023-11-01 DIAGNOSIS — Z3A.37 37 WEEKS GESTATION OF PREGNANCY: ICD-10-CM

## 2023-11-01 DIAGNOSIS — Z87.59 PERSONAL HISTORY OF OTHER COMPLICATIONS OF PREGNANCY, CHILDBIRTH AND THE PUERPERIUM: ICD-10-CM

## 2023-11-01 DIAGNOSIS — R03.0 ELEVATED BLOOD-PRESSURE READING, WITHOUT DIAGNOSIS OF HYPERTENSION: ICD-10-CM

## 2023-11-01 DIAGNOSIS — O09.293 SUPERVISION OF PREGNANCY WITH OTHER POOR REPRODUCTIVE OR OBSTETRIC HISTORY, THIRD TRIMESTER: ICD-10-CM

## 2023-11-01 DIAGNOSIS — O99.283 ENDOCRINE, NUTRITIONAL AND METABOLIC DISEASES COMPLICATING PREGNANCY, THIRD TRIMESTER: ICD-10-CM

## 2023-11-01 DIAGNOSIS — O26.893 OTHER SPECIFIED PREGNANCY RELATED CONDITIONS, THIRD TRIMESTER: ICD-10-CM

## 2023-11-01 DIAGNOSIS — O99.213 OBESITY COMPLICATING PREGNANCY, THIRD TRIMESTER: ICD-10-CM

## 2023-11-01 DIAGNOSIS — O24.414 GESTATIONAL DIABETES MELLITUS IN PREGNANCY, INSULIN CONTROLLED: ICD-10-CM

## 2023-11-01 DIAGNOSIS — E03.9 HYPOTHYROIDISM, UNSPECIFIED: ICD-10-CM

## 2023-11-01 DIAGNOSIS — E66.9 OBESITY, UNSPECIFIED: ICD-10-CM

## 2023-11-02 LAB
CULTURE RESULTS: SIGNIFICANT CHANGE UP
CULTURE RESULTS: SIGNIFICANT CHANGE UP
SPECIMEN SOURCE: SIGNIFICANT CHANGE UP
SPECIMEN SOURCE: SIGNIFICANT CHANGE UP

## 2023-11-03 ENCOUNTER — APPOINTMENT (OUTPATIENT)
Dept: ANTEPARTUM | Facility: CLINIC | Age: 38
End: 2023-11-03
Payer: COMMERCIAL

## 2023-11-03 ENCOUNTER — ASOB RESULT (OUTPATIENT)
Age: 38
End: 2023-11-03

## 2023-11-03 PROCEDURE — 76818 FETAL BIOPHYS PROFILE W/NST: CPT

## 2023-11-07 ENCOUNTER — ASOB RESULT (OUTPATIENT)
Age: 38
End: 2023-11-07

## 2023-11-07 ENCOUNTER — TRANSCRIPTION ENCOUNTER (OUTPATIENT)
Age: 38
End: 2023-11-07

## 2023-11-07 ENCOUNTER — INPATIENT (INPATIENT)
Facility: HOSPITAL | Age: 38
LOS: 4 days | Discharge: HOME | End: 2023-11-12
Attending: OBSTETRICS & GYNECOLOGY | Admitting: OBSTETRICS & GYNECOLOGY
Payer: COMMERCIAL

## 2023-11-07 ENCOUNTER — APPOINTMENT (OUTPATIENT)
Dept: ANTEPARTUM | Facility: CLINIC | Age: 38
End: 2023-11-07
Payer: COMMERCIAL

## 2023-11-07 VITALS
TEMPERATURE: 98 F | DIASTOLIC BLOOD PRESSURE: 86 MMHG | SYSTOLIC BLOOD PRESSURE: 130 MMHG | HEART RATE: 76 BPM | RESPIRATION RATE: 16 BRPM

## 2023-11-07 DIAGNOSIS — O26.899 OTHER SPECIFIED PREGNANCY RELATED CONDITIONS, UNSPECIFIED TRIMESTER: ICD-10-CM

## 2023-11-07 DIAGNOSIS — Z52.6 LIVER DONOR: Chronic | ICD-10-CM

## 2023-11-07 DIAGNOSIS — Z90.49 ACQUIRED ABSENCE OF OTHER SPECIFIED PARTS OF DIGESTIVE TRACT: Chronic | ICD-10-CM

## 2023-11-07 DIAGNOSIS — Z34.90 ENCOUNTER FOR SUPERVISION OF NORMAL PREGNANCY, UNSPECIFIED, UNSPECIFIED TRIMESTER: ICD-10-CM

## 2023-11-07 DIAGNOSIS — O14.90 UNSPECIFIED PRE-ECLAMPSIA, UNSPECIFIED TRIMESTER: ICD-10-CM

## 2023-11-07 DIAGNOSIS — Z98.890 OTHER SPECIFIED POSTPROCEDURAL STATES: Chronic | ICD-10-CM

## 2023-11-07 LAB
ALBUMIN SERPL ELPH-MCNC: 3.5 G/DL — SIGNIFICANT CHANGE UP (ref 3.3–5)
ALBUMIN SERPL ELPH-MCNC: 3.5 G/DL — SIGNIFICANT CHANGE UP (ref 3.3–5)
ALBUMIN SERPL ELPH-MCNC: 3.7 G/DL — SIGNIFICANT CHANGE UP (ref 3.3–5)
ALBUMIN SERPL ELPH-MCNC: 3.7 G/DL — SIGNIFICANT CHANGE UP (ref 3.3–5)
ALP SERPL-CCNC: 183 U/L — HIGH (ref 40–120)
ALT FLD-CCNC: 11 U/L — SIGNIFICANT CHANGE UP (ref 4–33)
ALT FLD-CCNC: 11 U/L — SIGNIFICANT CHANGE UP (ref 4–33)
ALT FLD-CCNC: 16 U/L — SIGNIFICANT CHANGE UP (ref 4–33)
ALT FLD-CCNC: 16 U/L — SIGNIFICANT CHANGE UP (ref 4–33)
ANION GAP SERPL CALC-SCNC: 13 MMOL/L — SIGNIFICANT CHANGE UP (ref 7–14)
ANION GAP SERPL CALC-SCNC: 13 MMOL/L — SIGNIFICANT CHANGE UP (ref 7–14)
ANION GAP SERPL CALC-SCNC: 15 MMOL/L — HIGH (ref 7–14)
ANION GAP SERPL CALC-SCNC: 15 MMOL/L — HIGH (ref 7–14)
APPEARANCE UR: CLEAR — SIGNIFICANT CHANGE UP
APPEARANCE UR: CLEAR — SIGNIFICANT CHANGE UP
APTT BLD: 31.3 SEC — SIGNIFICANT CHANGE UP (ref 24.5–35.6)
APTT BLD: 31.3 SEC — SIGNIFICANT CHANGE UP (ref 24.5–35.6)
APTT BLD: 31.6 SEC — SIGNIFICANT CHANGE UP (ref 24.5–35.6)
APTT BLD: 31.6 SEC — SIGNIFICANT CHANGE UP (ref 24.5–35.6)
AST SERPL-CCNC: 18 U/L — SIGNIFICANT CHANGE UP (ref 4–32)
AST SERPL-CCNC: 18 U/L — SIGNIFICANT CHANGE UP (ref 4–32)
AST SERPL-CCNC: 34 U/L — HIGH (ref 4–32)
AST SERPL-CCNC: 34 U/L — HIGH (ref 4–32)
BASOPHILS # BLD AUTO: 0.03 K/UL — SIGNIFICANT CHANGE UP (ref 0–0.2)
BASOPHILS # BLD AUTO: 0.03 K/UL — SIGNIFICANT CHANGE UP (ref 0–0.2)
BASOPHILS # BLD AUTO: 0.05 K/UL — SIGNIFICANT CHANGE UP (ref 0–0.2)
BASOPHILS # BLD AUTO: 0.05 K/UL — SIGNIFICANT CHANGE UP (ref 0–0.2)
BASOPHILS NFR BLD AUTO: 0.3 % — SIGNIFICANT CHANGE UP (ref 0–2)
BASOPHILS NFR BLD AUTO: 0.3 % — SIGNIFICANT CHANGE UP (ref 0–2)
BASOPHILS NFR BLD AUTO: 0.4 % — SIGNIFICANT CHANGE UP (ref 0–2)
BASOPHILS NFR BLD AUTO: 0.4 % — SIGNIFICANT CHANGE UP (ref 0–2)
BILIRUB SERPL-MCNC: <0.2 MG/DL — SIGNIFICANT CHANGE UP (ref 0.2–1.2)
BILIRUB UR-MCNC: NEGATIVE — SIGNIFICANT CHANGE UP
BILIRUB UR-MCNC: NEGATIVE — SIGNIFICANT CHANGE UP
BLD GP AB SCN SERPL QL: NEGATIVE — SIGNIFICANT CHANGE UP
BLD GP AB SCN SERPL QL: NEGATIVE — SIGNIFICANT CHANGE UP
BUN SERPL-MCNC: 8 MG/DL — SIGNIFICANT CHANGE UP (ref 7–23)
BUN SERPL-MCNC: 8 MG/DL — SIGNIFICANT CHANGE UP (ref 7–23)
BUN SERPL-MCNC: 9 MG/DL — SIGNIFICANT CHANGE UP (ref 7–23)
BUN SERPL-MCNC: 9 MG/DL — SIGNIFICANT CHANGE UP (ref 7–23)
CALCIUM SERPL-MCNC: 9 MG/DL — SIGNIFICANT CHANGE UP (ref 8.4–10.5)
CALCIUM SERPL-MCNC: 9 MG/DL — SIGNIFICANT CHANGE UP (ref 8.4–10.5)
CALCIUM SERPL-MCNC: 9.2 MG/DL — SIGNIFICANT CHANGE UP (ref 8.4–10.5)
CALCIUM SERPL-MCNC: 9.2 MG/DL — SIGNIFICANT CHANGE UP (ref 8.4–10.5)
CHLORIDE SERPL-SCNC: 100 MMOL/L — SIGNIFICANT CHANGE UP (ref 98–107)
CHLORIDE SERPL-SCNC: 100 MMOL/L — SIGNIFICANT CHANGE UP (ref 98–107)
CHLORIDE SERPL-SCNC: 104 MMOL/L — SIGNIFICANT CHANGE UP (ref 98–107)
CHLORIDE SERPL-SCNC: 104 MMOL/L — SIGNIFICANT CHANGE UP (ref 98–107)
CO2 SERPL-SCNC: 18 MMOL/L — LOW (ref 22–31)
CO2 SERPL-SCNC: 18 MMOL/L — LOW (ref 22–31)
CO2 SERPL-SCNC: 19 MMOL/L — LOW (ref 22–31)
CO2 SERPL-SCNC: 19 MMOL/L — LOW (ref 22–31)
COLOR SPEC: YELLOW — SIGNIFICANT CHANGE UP
COLOR SPEC: YELLOW — SIGNIFICANT CHANGE UP
CREAT ?TM UR-MCNC: 145 MG/DL — SIGNIFICANT CHANGE UP
CREAT ?TM UR-MCNC: 145 MG/DL — SIGNIFICANT CHANGE UP
CREAT SERPL-MCNC: 0.52 MG/DL — SIGNIFICANT CHANGE UP (ref 0.5–1.3)
CREAT SERPL-MCNC: 0.52 MG/DL — SIGNIFICANT CHANGE UP (ref 0.5–1.3)
CREAT SERPL-MCNC: 0.56 MG/DL — SIGNIFICANT CHANGE UP (ref 0.5–1.3)
CREAT SERPL-MCNC: 0.56 MG/DL — SIGNIFICANT CHANGE UP (ref 0.5–1.3)
DIFF PNL FLD: NEGATIVE — SIGNIFICANT CHANGE UP
DIFF PNL FLD: NEGATIVE — SIGNIFICANT CHANGE UP
EGFR: 119 ML/MIN/1.73M2 — SIGNIFICANT CHANGE UP
EGFR: 119 ML/MIN/1.73M2 — SIGNIFICANT CHANGE UP
EGFR: 121 ML/MIN/1.73M2 — SIGNIFICANT CHANGE UP
EGFR: 121 ML/MIN/1.73M2 — SIGNIFICANT CHANGE UP
EOSINOPHIL # BLD AUTO: 0.26 K/UL — SIGNIFICANT CHANGE UP (ref 0–0.5)
EOSINOPHIL # BLD AUTO: 0.26 K/UL — SIGNIFICANT CHANGE UP (ref 0–0.5)
EOSINOPHIL # BLD AUTO: 0.33 K/UL — SIGNIFICANT CHANGE UP (ref 0–0.5)
EOSINOPHIL # BLD AUTO: 0.33 K/UL — SIGNIFICANT CHANGE UP (ref 0–0.5)
EOSINOPHIL NFR BLD AUTO: 2 % — SIGNIFICANT CHANGE UP (ref 0–6)
EOSINOPHIL NFR BLD AUTO: 2 % — SIGNIFICANT CHANGE UP (ref 0–6)
EOSINOPHIL NFR BLD AUTO: 2.9 % — SIGNIFICANT CHANGE UP (ref 0–6)
EOSINOPHIL NFR BLD AUTO: 2.9 % — SIGNIFICANT CHANGE UP (ref 0–6)
FIBRINOGEN PPP-MCNC: 603 MG/DL — HIGH (ref 200–465)
GLUCOSE BLDC GLUCOMTR-MCNC: 111 MG/DL — HIGH (ref 70–99)
GLUCOSE BLDC GLUCOMTR-MCNC: 111 MG/DL — HIGH (ref 70–99)
GLUCOSE BLDC GLUCOMTR-MCNC: 78 MG/DL — SIGNIFICANT CHANGE UP (ref 70–99)
GLUCOSE BLDC GLUCOMTR-MCNC: 78 MG/DL — SIGNIFICANT CHANGE UP (ref 70–99)
GLUCOSE BLDC GLUCOMTR-MCNC: 99 MG/DL — SIGNIFICANT CHANGE UP (ref 70–99)
GLUCOSE BLDC GLUCOMTR-MCNC: 99 MG/DL — SIGNIFICANT CHANGE UP (ref 70–99)
GLUCOSE SERPL-MCNC: 75 MG/DL — SIGNIFICANT CHANGE UP (ref 70–99)
GLUCOSE SERPL-MCNC: 75 MG/DL — SIGNIFICANT CHANGE UP (ref 70–99)
GLUCOSE SERPL-MCNC: 78 MG/DL — SIGNIFICANT CHANGE UP (ref 70–99)
GLUCOSE SERPL-MCNC: 78 MG/DL — SIGNIFICANT CHANGE UP (ref 70–99)
GLUCOSE UR QL: NEGATIVE MG/DL — SIGNIFICANT CHANGE UP
GLUCOSE UR QL: NEGATIVE MG/DL — SIGNIFICANT CHANGE UP
HCT VFR BLD CALC: 36.4 % — SIGNIFICANT CHANGE UP (ref 34.5–45)
HCT VFR BLD CALC: 36.4 % — SIGNIFICANT CHANGE UP (ref 34.5–45)
HCT VFR BLD CALC: 38.3 % — SIGNIFICANT CHANGE UP (ref 34.5–45)
HCT VFR BLD CALC: 38.3 % — SIGNIFICANT CHANGE UP (ref 34.5–45)
HGB BLD-MCNC: 12.3 G/DL — SIGNIFICANT CHANGE UP (ref 11.5–15.5)
HGB BLD-MCNC: 12.3 G/DL — SIGNIFICANT CHANGE UP (ref 11.5–15.5)
HGB BLD-MCNC: 12.8 G/DL — SIGNIFICANT CHANGE UP (ref 11.5–15.5)
HGB BLD-MCNC: 12.8 G/DL — SIGNIFICANT CHANGE UP (ref 11.5–15.5)
IANC: 8.26 K/UL — HIGH (ref 1.8–7.4)
IANC: 8.26 K/UL — HIGH (ref 1.8–7.4)
IANC: 9.8 K/UL — HIGH (ref 1.8–7.4)
IANC: 9.8 K/UL — HIGH (ref 1.8–7.4)
IMM GRANULOCYTES NFR BLD AUTO: 0.4 % — SIGNIFICANT CHANGE UP (ref 0–0.9)
INR BLD: 0.9 RATIO — SIGNIFICANT CHANGE UP (ref 0.85–1.18)
INR BLD: 0.9 RATIO — SIGNIFICANT CHANGE UP (ref 0.85–1.18)
INR BLD: 0.95 RATIO — SIGNIFICANT CHANGE UP (ref 0.85–1.18)
INR BLD: 0.95 RATIO — SIGNIFICANT CHANGE UP (ref 0.85–1.18)
KETONES UR-MCNC: NEGATIVE MG/DL — SIGNIFICANT CHANGE UP
KETONES UR-MCNC: NEGATIVE MG/DL — SIGNIFICANT CHANGE UP
LDH SERPL L TO P-CCNC: 165 U/L — SIGNIFICANT CHANGE UP (ref 135–225)
LDH SERPL L TO P-CCNC: 165 U/L — SIGNIFICANT CHANGE UP (ref 135–225)
LDH SERPL L TO P-CCNC: 357 U/L — HIGH (ref 135–225)
LDH SERPL L TO P-CCNC: 357 U/L — HIGH (ref 135–225)
LEUKOCYTE ESTERASE UR-ACNC: NEGATIVE — SIGNIFICANT CHANGE UP
LEUKOCYTE ESTERASE UR-ACNC: NEGATIVE — SIGNIFICANT CHANGE UP
LYMPHOCYTES # BLD AUTO: 17.2 % — SIGNIFICANT CHANGE UP (ref 13–44)
LYMPHOCYTES # BLD AUTO: 17.2 % — SIGNIFICANT CHANGE UP (ref 13–44)
LYMPHOCYTES # BLD AUTO: 17.6 % — SIGNIFICANT CHANGE UP (ref 13–44)
LYMPHOCYTES # BLD AUTO: 17.6 % — SIGNIFICANT CHANGE UP (ref 13–44)
LYMPHOCYTES # BLD AUTO: 2.01 K/UL — SIGNIFICANT CHANGE UP (ref 1–3.3)
LYMPHOCYTES # BLD AUTO: 2.01 K/UL — SIGNIFICANT CHANGE UP (ref 1–3.3)
LYMPHOCYTES # BLD AUTO: 2.28 K/UL — SIGNIFICANT CHANGE UP (ref 1–3.3)
LYMPHOCYTES # BLD AUTO: 2.28 K/UL — SIGNIFICANT CHANGE UP (ref 1–3.3)
MAGNESIUM SERPL-MCNC: 4.6 MG/DL — HIGH (ref 1.6–2.6)
MAGNESIUM SERPL-MCNC: 4.6 MG/DL — HIGH (ref 1.6–2.6)
MCHC RBC-ENTMCNC: 27.5 PG — SIGNIFICANT CHANGE UP (ref 27–34)
MCHC RBC-ENTMCNC: 27.5 PG — SIGNIFICANT CHANGE UP (ref 27–34)
MCHC RBC-ENTMCNC: 27.6 PG — SIGNIFICANT CHANGE UP (ref 27–34)
MCHC RBC-ENTMCNC: 27.6 PG — SIGNIFICANT CHANGE UP (ref 27–34)
MCHC RBC-ENTMCNC: 33.4 GM/DL — SIGNIFICANT CHANGE UP (ref 32–36)
MCHC RBC-ENTMCNC: 33.4 GM/DL — SIGNIFICANT CHANGE UP (ref 32–36)
MCHC RBC-ENTMCNC: 33.8 GM/DL — SIGNIFICANT CHANGE UP (ref 32–36)
MCHC RBC-ENTMCNC: 33.8 GM/DL — SIGNIFICANT CHANGE UP (ref 32–36)
MCV RBC AUTO: 81.6 FL — SIGNIFICANT CHANGE UP (ref 80–100)
MCV RBC AUTO: 81.6 FL — SIGNIFICANT CHANGE UP (ref 80–100)
MCV RBC AUTO: 82.2 FL — SIGNIFICANT CHANGE UP (ref 80–100)
MCV RBC AUTO: 82.2 FL — SIGNIFICANT CHANGE UP (ref 80–100)
MONOCYTES # BLD AUTO: 0.73 K/UL — SIGNIFICANT CHANGE UP (ref 0–0.9)
MONOCYTES # BLD AUTO: 0.73 K/UL — SIGNIFICANT CHANGE UP (ref 0–0.9)
MONOCYTES # BLD AUTO: 0.82 K/UL — SIGNIFICANT CHANGE UP (ref 0–0.9)
MONOCYTES # BLD AUTO: 0.82 K/UL — SIGNIFICANT CHANGE UP (ref 0–0.9)
MONOCYTES NFR BLD AUTO: 6.2 % — SIGNIFICANT CHANGE UP (ref 2–14)
MONOCYTES NFR BLD AUTO: 6.2 % — SIGNIFICANT CHANGE UP (ref 2–14)
MONOCYTES NFR BLD AUTO: 6.4 % — SIGNIFICANT CHANGE UP (ref 2–14)
MONOCYTES NFR BLD AUTO: 6.4 % — SIGNIFICANT CHANGE UP (ref 2–14)
NEUTROPHILS # BLD AUTO: 8.26 K/UL — HIGH (ref 1.8–7.4)
NEUTROPHILS # BLD AUTO: 8.26 K/UL — HIGH (ref 1.8–7.4)
NEUTROPHILS # BLD AUTO: 9.8 K/UL — HIGH (ref 1.8–7.4)
NEUTROPHILS # BLD AUTO: 9.8 K/UL — HIGH (ref 1.8–7.4)
NEUTROPHILS NFR BLD AUTO: 72.4 % — SIGNIFICANT CHANGE UP (ref 43–77)
NEUTROPHILS NFR BLD AUTO: 72.4 % — SIGNIFICANT CHANGE UP (ref 43–77)
NEUTROPHILS NFR BLD AUTO: 73.8 % — SIGNIFICANT CHANGE UP (ref 43–77)
NEUTROPHILS NFR BLD AUTO: 73.8 % — SIGNIFICANT CHANGE UP (ref 43–77)
NITRITE UR-MCNC: NEGATIVE — SIGNIFICANT CHANGE UP
NITRITE UR-MCNC: NEGATIVE — SIGNIFICANT CHANGE UP
NRBC # BLD: 0 /100 WBCS — SIGNIFICANT CHANGE UP (ref 0–0)
NRBC # FLD: 0 K/UL — SIGNIFICANT CHANGE UP (ref 0–0)
PH UR: 6.5 — SIGNIFICANT CHANGE UP (ref 5–8)
PH UR: 6.5 — SIGNIFICANT CHANGE UP (ref 5–8)
PLATELET # BLD AUTO: 242 K/UL — SIGNIFICANT CHANGE UP (ref 150–400)
PLATELET # BLD AUTO: 242 K/UL — SIGNIFICANT CHANGE UP (ref 150–400)
PLATELET # BLD AUTO: 249 K/UL — SIGNIFICANT CHANGE UP (ref 150–400)
PLATELET # BLD AUTO: 249 K/UL — SIGNIFICANT CHANGE UP (ref 150–400)
POTASSIUM SERPL-MCNC: 3.9 MMOL/L — SIGNIFICANT CHANGE UP (ref 3.5–5.3)
POTASSIUM SERPL-MCNC: 3.9 MMOL/L — SIGNIFICANT CHANGE UP (ref 3.5–5.3)
POTASSIUM SERPL-MCNC: 4.7 MMOL/L — SIGNIFICANT CHANGE UP (ref 3.5–5.3)
POTASSIUM SERPL-MCNC: 4.7 MMOL/L — SIGNIFICANT CHANGE UP (ref 3.5–5.3)
POTASSIUM SERPL-SCNC: 3.9 MMOL/L — SIGNIFICANT CHANGE UP (ref 3.5–5.3)
POTASSIUM SERPL-SCNC: 3.9 MMOL/L — SIGNIFICANT CHANGE UP (ref 3.5–5.3)
POTASSIUM SERPL-SCNC: 4.7 MMOL/L — SIGNIFICANT CHANGE UP (ref 3.5–5.3)
POTASSIUM SERPL-SCNC: 4.7 MMOL/L — SIGNIFICANT CHANGE UP (ref 3.5–5.3)
PROT ?TM UR-MCNC: 25 MG/DL — SIGNIFICANT CHANGE UP
PROT SERPL-MCNC: 6.8 G/DL — SIGNIFICANT CHANGE UP (ref 6–8.3)
PROT SERPL-MCNC: 6.8 G/DL — SIGNIFICANT CHANGE UP (ref 6–8.3)
PROT SERPL-MCNC: 6.9 G/DL — SIGNIFICANT CHANGE UP (ref 6–8.3)
PROT SERPL-MCNC: 6.9 G/DL — SIGNIFICANT CHANGE UP (ref 6–8.3)
PROT UR-MCNC: SIGNIFICANT CHANGE UP MG/DL
PROT UR-MCNC: SIGNIFICANT CHANGE UP MG/DL
PROT/CREAT UR-RTO: 0.2 RATIO — SIGNIFICANT CHANGE UP (ref 0–0.2)
PROT/CREAT UR-RTO: 0.2 RATIO — SIGNIFICANT CHANGE UP (ref 0–0.2)
PROTHROM AB SERPL-ACNC: 10.1 SEC — SIGNIFICANT CHANGE UP (ref 9.5–13)
PROTHROM AB SERPL-ACNC: 10.1 SEC — SIGNIFICANT CHANGE UP (ref 9.5–13)
PROTHROM AB SERPL-ACNC: 10.7 SEC — SIGNIFICANT CHANGE UP (ref 9.5–13)
PROTHROM AB SERPL-ACNC: 10.7 SEC — SIGNIFICANT CHANGE UP (ref 9.5–13)
RBC # BLD: 4.46 M/UL — SIGNIFICANT CHANGE UP (ref 3.8–5.2)
RBC # BLD: 4.46 M/UL — SIGNIFICANT CHANGE UP (ref 3.8–5.2)
RBC # BLD: 4.66 M/UL — SIGNIFICANT CHANGE UP (ref 3.8–5.2)
RBC # BLD: 4.66 M/UL — SIGNIFICANT CHANGE UP (ref 3.8–5.2)
RBC # FLD: 14.9 % — HIGH (ref 10.3–14.5)
RBC # FLD: 14.9 % — HIGH (ref 10.3–14.5)
RBC # FLD: 15.3 % — HIGH (ref 10.3–14.5)
RBC # FLD: 15.3 % — HIGH (ref 10.3–14.5)
RH IG SCN BLD-IMP: POSITIVE — SIGNIFICANT CHANGE UP
RH IG SCN BLD-IMP: POSITIVE — SIGNIFICANT CHANGE UP
SODIUM SERPL-SCNC: 134 MMOL/L — LOW (ref 135–145)
SODIUM SERPL-SCNC: 134 MMOL/L — LOW (ref 135–145)
SODIUM SERPL-SCNC: 135 MMOL/L — SIGNIFICANT CHANGE UP (ref 135–145)
SODIUM SERPL-SCNC: 135 MMOL/L — SIGNIFICANT CHANGE UP (ref 135–145)
SP GR SPEC: 1.02 — SIGNIFICANT CHANGE UP (ref 1–1.03)
SP GR SPEC: 1.02 — SIGNIFICANT CHANGE UP (ref 1–1.03)
URATE SERPL-MCNC: 5.5 MG/DL — SIGNIFICANT CHANGE UP (ref 2.5–7)
URATE SERPL-MCNC: 5.5 MG/DL — SIGNIFICANT CHANGE UP (ref 2.5–7)
URATE SERPL-MCNC: 5.7 MG/DL — SIGNIFICANT CHANGE UP (ref 2.5–7)
URATE SERPL-MCNC: 5.7 MG/DL — SIGNIFICANT CHANGE UP (ref 2.5–7)
UROBILINOGEN FLD QL: 0.2 MG/DL — SIGNIFICANT CHANGE UP (ref 0.2–1)
UROBILINOGEN FLD QL: 0.2 MG/DL — SIGNIFICANT CHANGE UP (ref 0.2–1)
WBC # BLD: 11.41 K/UL — HIGH (ref 3.8–10.5)
WBC # BLD: 11.41 K/UL — HIGH (ref 3.8–10.5)
WBC # BLD: 13.26 K/UL — HIGH (ref 3.8–10.5)
WBC # BLD: 13.26 K/UL — HIGH (ref 3.8–10.5)
WBC # FLD AUTO: 11.41 K/UL — HIGH (ref 3.8–10.5)
WBC # FLD AUTO: 11.41 K/UL — HIGH (ref 3.8–10.5)
WBC # FLD AUTO: 13.26 K/UL — HIGH (ref 3.8–10.5)
WBC # FLD AUTO: 13.26 K/UL — HIGH (ref 3.8–10.5)

## 2023-11-07 PROCEDURE — 76815 OB US LIMITED FETUS(S): CPT | Mod: 26

## 2023-11-07 RX ORDER — MAGNESIUM SULFATE 500 MG/ML
4 VIAL (ML) INJECTION ONCE
Refills: 0 | Status: COMPLETED | OUTPATIENT
Start: 2023-11-07 | End: 2023-11-07

## 2023-11-07 RX ORDER — SODIUM CHLORIDE 9 MG/ML
1000 INJECTION, SOLUTION INTRAVENOUS
Refills: 0 | Status: DISCONTINUED | OUTPATIENT
Start: 2023-11-07 | End: 2023-11-08

## 2023-11-07 RX ORDER — OXYTOCIN 10 UNIT/ML
2 VIAL (ML) INJECTION
Qty: 30 | Refills: 0 | Status: DISCONTINUED | OUTPATIENT
Start: 2023-11-07 | End: 2023-11-08

## 2023-11-07 RX ORDER — INFLUENZA VIRUS VACCINE 15; 15; 15; 15 UG/.5ML; UG/.5ML; UG/.5ML; UG/.5ML
0.5 SUSPENSION INTRAMUSCULAR ONCE
Refills: 0 | Status: COMPLETED | OUTPATIENT
Start: 2023-11-07 | End: 2023-11-10

## 2023-11-07 RX ORDER — VANCOMYCIN HCL 1 G
VIAL (EA) INTRAVENOUS
Refills: 0 | Status: DISCONTINUED | OUTPATIENT
Start: 2023-11-07 | End: 2023-11-07

## 2023-11-07 RX ORDER — VANCOMYCIN HCL 1 G
2000 VIAL (EA) INTRAVENOUS EVERY 8 HOURS
Refills: 0 | Status: DISCONTINUED | OUTPATIENT
Start: 2023-11-07 | End: 2023-11-08

## 2023-11-07 RX ORDER — MAGNESIUM SULFATE 500 MG/ML
2 VIAL (ML) INJECTION
Qty: 40 | Refills: 0 | Status: COMPLETED | OUTPATIENT
Start: 2023-11-07 | End: 2023-11-08

## 2023-11-07 RX ORDER — VANCOMYCIN HCL 1 G
2000 VIAL (EA) INTRAVENOUS ONCE
Refills: 0 | Status: COMPLETED | OUTPATIENT
Start: 2023-11-07 | End: 2023-11-07

## 2023-11-07 RX ORDER — VANCOMYCIN HCL 1 G
VIAL (EA) INTRAVENOUS
Refills: 0 | Status: DISCONTINUED | OUTPATIENT
Start: 2023-11-07 | End: 2023-11-08

## 2023-11-07 RX ORDER — CHLORHEXIDINE GLUCONATE 213 G/1000ML
1 SOLUTION TOPICAL DAILY
Refills: 0 | Status: DISCONTINUED | OUTPATIENT
Start: 2023-11-07 | End: 2023-11-08

## 2023-11-07 RX ORDER — SODIUM CHLORIDE 9 MG/ML
3 INJECTION INTRAMUSCULAR; INTRAVENOUS; SUBCUTANEOUS EVERY 8 HOURS
Refills: 0 | Status: DISCONTINUED | OUTPATIENT
Start: 2023-11-07 | End: 2023-11-08

## 2023-11-07 RX ORDER — OXYTOCIN 10 UNIT/ML
333.33 VIAL (ML) INJECTION
Qty: 20 | Refills: 0 | Status: DISCONTINUED | OUTPATIENT
Start: 2023-11-07 | End: 2023-11-09

## 2023-11-07 RX ORDER — SODIUM CHLORIDE 9 MG/ML
1000 INJECTION INTRAMUSCULAR; INTRAVENOUS; SUBCUTANEOUS
Refills: 0 | Status: DISCONTINUED | OUTPATIENT
Start: 2023-11-07 | End: 2023-11-08

## 2023-11-07 RX ADMIN — Medication 50 GM/HR: at 19:49

## 2023-11-07 RX ADMIN — CHLORHEXIDINE GLUCONATE 1 APPLICATION(S): 213 SOLUTION TOPICAL at 16:12

## 2023-11-07 RX ADMIN — Medication 50 GM/HR: at 15:45

## 2023-11-07 RX ADMIN — Medication 300 GRAM(S): at 15:22

## 2023-11-07 RX ADMIN — Medication 250 MILLIGRAM(S): at 15:27

## 2023-11-07 RX ADMIN — SODIUM CHLORIDE 3 MILLILITER(S): 9 INJECTION INTRAMUSCULAR; INTRAVENOUS; SUBCUTANEOUS at 21:00

## 2023-11-07 RX ADMIN — SODIUM CHLORIDE 125 MILLILITER(S): 9 INJECTION INTRAMUSCULAR; INTRAVENOUS; SUBCUTANEOUS at 19:49

## 2023-11-07 RX ADMIN — SODIUM CHLORIDE 125 MILLILITER(S): 9 INJECTION, SOLUTION INTRAVENOUS at 15:30

## 2023-11-07 NOTE — OB PROVIDER H&P - ASSESSMENT
37 y/o @ 38.6 wks gestation with PEC with severe features / GDMA2 / Vancomycin / Magnesium Sulfate / buccal cytotec / CB   plan of care d/w dr armendariz  admit to l&D  38.6 wks gestation with  PEC with severe features / GDMA2 / Vancomycin / Magnesium Sulfate / buccal cytotec / CB   see admission orders 37 y/o @ 38.6 wks gestation with PEC with severe features / GDMA2 / Vancomycin / Magnesium Sulfate / buccal cytotec / CB   plan of care d/w dr armendariz/ dr encinas   admit to l&D  38.6 wks gestation with  PEC with severe features / GDMA2 / Vancomycin / Magnesium Sulfate / buccal cytotec / CB   see admission orders

## 2023-11-07 NOTE — OB PROVIDER TRIAGE NOTE - NSICDXPASTSURGICALHX_GEN_ALL_CORE_FT
PAST SURGICAL HISTORY:  Encounter for donation of liver     S/P bilateral breast reduction     S/P cholecystectomy

## 2023-11-07 NOTE — OB PROVIDER H&P - NSHPPHYSICALEXAM_GEN_ALL_CORE
abdomen: soft, nt on palp  T(C): 36.6 (11-07-23 @ 11:58), Max: 36.6 (11-07-23 @ 11:58)  HR: 68 (11-07-23 @ 12:59) (68 - 88)  BP: 114/67 (11-07-23 @ 12:59) (114/67 - 130/86) semi fowlers position   RR: 16 (11-07-23 @ 11:58) (16 - 16)  SpO2: --  NST reactive   FH baseline 120 FH variability mod +FH accels no FH decels   toco: irritability noted      sono reports in ASOB  sono images in ASOB   OB limited sono :  TAS: vertex MVP: 4.52 posterior placenta FH- 131 bpm   EFW: 3300 grams      addendum @ 1410:  T(C): 36.6 (11-07-23 @ 11:58), Max: 36.6 (11-07-23 @ 11:58)  HR: 74 (11-07-23 @ 13:59) (65 - 88)  BP: 119/79 (11-07-23 @ 13:59) (105/63 - 130/86) semi fowlers position   RR: 16 (11-07-23 @ 11:58) (16 - 16)  SpO2: --    SVE closed/0/-3 abdomen: soft, nt on palp  T(C): 36.6 (11-07-23 @ 11:58), Max: 36.6 (11-07-23 @ 11:58)  HR: 68 (11-07-23 @ 12:59) (68 - 88)  BP: 114/67 (11-07-23 @ 12:59) (114/67 - 130/86) semi fowlers position   RR: 16 (11-07-23 @ 11:58) (16 - 16)  SpO2: --  NST reactive   FH baseline 120 FH variability mod +FH accels no FH decels   toco: irritability noted      sono reports in ASOB  sono images in ASOB   OB limited sono :  TAS: vertex MVP: 4.52 anterior placenta FH- 131 bpm   EFW: 3300 grams      addendum @ 1410:  T(C): 36.6 (11-07-23 @ 11:58), Max: 36.6 (11-07-23 @ 11:58)  HR: 74 (11-07-23 @ 13:59) (65 - 88)  BP: 119/79 (11-07-23 @ 13:59) (105/63 - 130/86) semi fowlers position   RR: 16 (11-07-23 @ 11:58) (16 - 16)  SpO2: --    SVE closed/0/-3

## 2023-11-07 NOTE — OB PROVIDER H&P - HISTORY OF PRESENT ILLNESS
37 y/o  @ 38.6 wks gestation presents with c/o elevated BP @ home 151/ 94and states has had " a little" headache 3/10 on pain scale no meds denies any visual disturbances or right upper epigastric pain denies any n/v/d denies any fever or chills denies any uc's vb or lof reports +FM ap care comp by :   GDMA2 - Novolog 50 units hs   baby ASA qd   BMI: 48.2 morbid obese   recd RSV vaccine @ 36 wks gestation   scheduled IOL 11/10/2023

## 2023-11-07 NOTE — OB PROVIDER TRIAGE NOTE - NSHPLABSRESULTS_GEN_ALL_CORE
PEC labs PEC labs  CBC Full  -  ( 2023 12:54 )  WBC Count : 11.41 K/uL  RBC Count : 4.46 M/uL  Hemoglobin : 12.3 g/dL  Hematocrit : 36.4 %  Platelet Count - Automated : 242 K/uL  Mean Cell Volume : 81.6 fL  Mean Cell Hemoglobin : 27.6 pg  Mean Cell Hemoglobin Concentration : 33.8 gm/dL  Auto Neutrophil # : 8.26 K/uL  Auto Lymphocyte # : 2.01 K/uL  Auto Monocyte # : 0.73 K/uL  Auto Eosinophil # : 0.33 K/uL  Auto Basophil # : 0.03 K/uL  Auto Neutrophil % : 72.4 %  Auto Lymphocyte % : 17.6 %  Auto Monocyte % : 6.4 %  Auto Eosinophil % : 2.9 %  Auto Basophil % : 0.3 %    Urinalysis Basic - ( 2023 12:54 )    Color: Yellow / Appearance: Clear / S.024 / pH: x  Gluc: 78 mg/dL / Ketone: Negative mg/dL  / Bili: Negative / Urobili: 0.2 mg/dL   Blood: x / Protein: Trace mg/dL / Nitrite: Negative   Leuk Esterase: Negative / RBC: x / WBC x   Sq Epi: x / Non Sq Epi: x / Bacteria: x    PCR: 0.2    PT/INR - ( 2023 12:54 )   PT: 10.7 sec;   INR: 0.95 ratio         PTT - ( 2023 12:54 )  PTT:31.3 sec    fibrinogen: 603        135  |  104  |  8   ----------------------------<  78  3.9   |  18<L>  |  0.56    Ca    9.2      2023 12:54    TPro  6.8  /  Alb  3.5  /  TBili  <0.2  /  DBili  x   /  AST  18  /  ALT  11  /  AlkPhos  183<H>      uric acid: 5.5    LDH: 165

## 2023-11-07 NOTE — OB RN TRIAGE NOTE - CURRENT PREGNANCY COMPLICATIONS, OB PROFILE
A2/Gestational Diabetes/Hypertensive Disorder A2/Gestational Diabetes/Hypertensive Disorder/Maternal Positive GBS

## 2023-11-07 NOTE — OB PROVIDER H&P - NS_DILATION_OBGYN_ALL_OB_NU
[Supportive] : Goals of care discussed with patient: Supportive [FreeTextEntry1] : refuses hospice 0

## 2023-11-07 NOTE — OB PROVIDER TRIAGE NOTE - HISTORY OF PRESENT ILLNESS
39 y/o  @ 38.6 wks gestation presents with c/o elevated BP @ home 151/ 94and states has had " a little" headache 3/10 on pain scale no meds denies any visual disturbances or right upper epigastric pain denies any n/v/d denies any fever or chills denies any uc's vb or lof reports +FM ap care comp by :   GDMA2 - Novolog 50 units hs   baby ASA qd   BMI: 48.2 morbid obese   recd RSV vaccine @ 36 wks gestation   scheduled IOL 11/10/2023

## 2023-11-07 NOTE — OB PROVIDER TRIAGE NOTE - NSOBPROVIDERNOTE_OBGYN_ALL_OB_FT
37 y/o @ 38.6 wks gestation with PEC with severe features / GDMA2 / Vancomycin / Magnesium Sulfate / buccal cytotec / CB   plan of care d/w dr armendariz  admit to l&D  38.6 wks gestation with  PEC with severe features / GDMA2 / Vancomycin / Magnesium Sulfate / buccal cytotec / CB   see admission orders

## 2023-11-07 NOTE — OB PROVIDER H&P - NSHPLABSRESULTS_GEN_ALL_CORE
PEC labs  CBC Full  -  ( 2023 12:54 )  WBC Count : 11.41 K/uL  RBC Count : 4.46 M/uL  Hemoglobin : 12.3 g/dL  Hematocrit : 36.4 %  Platelet Count - Automated : 242 K/uL  Mean Cell Volume : 81.6 fL  Mean Cell Hemoglobin : 27.6 pg  Mean Cell Hemoglobin Concentration : 33.8 gm/dL  Auto Neutrophil # : 8.26 K/uL  Auto Lymphocyte # : 2.01 K/uL  Auto Monocyte # : 0.73 K/uL  Auto Eosinophil # : 0.33 K/uL  Auto Basophil # : 0.03 K/uL  Auto Neutrophil % : 72.4 %  Auto Lymphocyte % : 17.6 %  Auto Monocyte % : 6.4 %  Auto Eosinophil % : 2.9 %  Auto Basophil % : 0.3 %    Urinalysis Basic - ( 2023 12:54 )    Color: Yellow / Appearance: Clear / S.024 / pH: x  Gluc: 78 mg/dL / Ketone: Negative mg/dL  / Bili: Negative / Urobili: 0.2 mg/dL   Blood: x / Protein: Trace mg/dL / Nitrite: Negative   Leuk Esterase: Negative / RBC: x / WBC x   Sq Epi: x / Non Sq Epi: x / Bacteria: x    PCR: 0.2    PT/INR - ( 2023 12:54 )   PT: 10.7 sec;   INR: 0.95 ratio         PTT - ( 2023 12:54 )  PTT:31.3 sec    fibrinogen: 603        135  |  104  |  8   ----------------------------<  78  3.9   |  18<L>  |  0.56    Ca    9.2      2023 12:54    TPro  6.8  /  Alb  3.5  /  TBili  <0.2  /  DBili  x   /  AST  18  /  ALT  11  /  AlkPhos  183<H>      uric acid: 5.5    LDH: 165

## 2023-11-07 NOTE — OB PROVIDER TRIAGE NOTE - NSHPPHYSICALEXAM_GEN_ALL_CORE
abdomen: soft, nt on palp  T(C): 36.6 (11-07-23 @ 11:58), Max: 36.6 (11-07-23 @ 11:58)  HR: 68 (11-07-23 @ 12:59) (68 - 88)  BP: 114/67 (11-07-23 @ 12:59) (114/67 - 130/86) semi fowlers position   RR: 16 (11-07-23 @ 11:58) (16 - 16)  SpO2: --  NST reactive   FH baseline 120 FH variability mod +FH accels no FH decels   toco: irritability noted abdomen: soft, nt on palp  T(C): 36.6 (11-07-23 @ 11:58), Max: 36.6 (11-07-23 @ 11:58)  HR: 68 (11-07-23 @ 12:59) (68 - 88)  BP: 114/67 (11-07-23 @ 12:59) (114/67 - 130/86) semi fowlers position   RR: 16 (11-07-23 @ 11:58) (16 - 16)  SpO2: --  NST reactive   FH baseline 120 FH variability mod +FH accels no FH decels   toco: irritability noted      sono reports in ASOB  sono images in ASOB   OB limited sono :  TAS: vertex MVP: 4.52 posterior placenta FH- 131 bpm   EFW: 3300 grams      addendum @ 1410:  T(C): 36.6 (11-07-23 @ 11:58), Max: 36.6 (11-07-23 @ 11:58)  HR: 74 (11-07-23 @ 13:59) (65 - 88)  BP: 119/79 (11-07-23 @ 13:59) (105/63 - 130/86) semi fowlers position   RR: 16 (11-07-23 @ 11:58) (16 - 16)  SpO2: --    SVE closed/0/-3 abdomen: soft, nt on palp  T(C): 36.6 (11-07-23 @ 11:58), Max: 36.6 (11-07-23 @ 11:58)  HR: 68 (11-07-23 @ 12:59) (68 - 88)  BP: 114/67 (11-07-23 @ 12:59) (114/67 - 130/86) semi fowlers position   RR: 16 (11-07-23 @ 11:58) (16 - 16)  SpO2: --  NST reactive   FH baseline 120 FH variability mod +FH accels no FH decels   toco: irritability noted      sono reports in ASOB  sono images in ASOB   OB limited sono :  TAS: vertex MVP: 4.52 anterior  placenta FH- 131 bpm   EFW: 3300 grams      addendum @ 1410:  T(C): 36.6 (11-07-23 @ 11:58), Max: 36.6 (11-07-23 @ 11:58)  HR: 74 (11-07-23 @ 13:59) (65 - 88)  BP: 119/79 (11-07-23 @ 13:59) (105/63 - 130/86) semi fowlers position   RR: 16 (11-07-23 @ 11:58) (16 - 16)  SpO2: --    SVE closed/0/-3

## 2023-11-07 NOTE — OB PROVIDER H&P - NS_STATION_OBGYN_ALL_OB_NU
How Severe Is Your Rosacea?: moderate Is This A New Presentation, Or A Follow-Up?: Rosacea Additional History: Reports that she gets facial flushing. Was previously prescribed something but it was not covered and very expensive -3

## 2023-11-07 NOTE — OB PROVIDER TRIAGE NOTE - NS_PHYSICALALLNEG_OBGYN_ALL_OB
Left message for patient to reschedule upcoming appointment with Dr. Hidalgo on 12/27/23.   Clinician is out of office, please reschedule for alternate date/time or clinician.   
All other review of systems negative, except as noted in HPI

## 2023-11-08 ENCOUNTER — TRANSCRIPTION ENCOUNTER (OUTPATIENT)
Age: 38
End: 2023-11-08

## 2023-11-08 DIAGNOSIS — O14.10 SEVERE PRE-ECLAMPSIA, UNSPECIFIED TRIMESTER: ICD-10-CM

## 2023-11-08 LAB
ALBUMIN SERPL ELPH-MCNC: 3.5 G/DL — SIGNIFICANT CHANGE UP (ref 3.3–5)
ALBUMIN SERPL ELPH-MCNC: 3.5 G/DL — SIGNIFICANT CHANGE UP (ref 3.3–5)
ALBUMIN SERPL ELPH-MCNC: 3.6 G/DL — SIGNIFICANT CHANGE UP (ref 3.3–5)
ALBUMIN SERPL ELPH-MCNC: 3.6 G/DL — SIGNIFICANT CHANGE UP (ref 3.3–5)
ALP SERPL-CCNC: 181 U/L — HIGH (ref 40–120)
ALP SERPL-CCNC: 181 U/L — HIGH (ref 40–120)
ALP SERPL-CCNC: 194 U/L — HIGH (ref 40–120)
ALP SERPL-CCNC: 194 U/L — HIGH (ref 40–120)
ALT FLD-CCNC: 10 U/L — SIGNIFICANT CHANGE UP (ref 4–33)
ANION GAP SERPL CALC-SCNC: 14 MMOL/L — SIGNIFICANT CHANGE UP (ref 7–14)
ANION GAP SERPL CALC-SCNC: 14 MMOL/L — SIGNIFICANT CHANGE UP (ref 7–14)
ANION GAP SERPL CALC-SCNC: 15 MMOL/L — HIGH (ref 7–14)
ANION GAP SERPL CALC-SCNC: 15 MMOL/L — HIGH (ref 7–14)
APTT BLD: 29.4 SEC — SIGNIFICANT CHANGE UP (ref 24.5–35.6)
AST SERPL-CCNC: 18 U/L — SIGNIFICANT CHANGE UP (ref 4–32)
AST SERPL-CCNC: 18 U/L — SIGNIFICANT CHANGE UP (ref 4–32)
AST SERPL-CCNC: 20 U/L — SIGNIFICANT CHANGE UP (ref 4–32)
AST SERPL-CCNC: 20 U/L — SIGNIFICANT CHANGE UP (ref 4–32)
BASOPHILS # BLD AUTO: 0.02 K/UL — SIGNIFICANT CHANGE UP (ref 0–0.2)
BASOPHILS # BLD AUTO: 0.02 K/UL — SIGNIFICANT CHANGE UP (ref 0–0.2)
BASOPHILS # BLD AUTO: 0.03 K/UL — SIGNIFICANT CHANGE UP (ref 0–0.2)
BASOPHILS # BLD AUTO: 0.03 K/UL — SIGNIFICANT CHANGE UP (ref 0–0.2)
BASOPHILS NFR BLD AUTO: 0.1 % — SIGNIFICANT CHANGE UP (ref 0–2)
BASOPHILS NFR BLD AUTO: 0.1 % — SIGNIFICANT CHANGE UP (ref 0–2)
BASOPHILS NFR BLD AUTO: 0.2 % — SIGNIFICANT CHANGE UP (ref 0–2)
BASOPHILS NFR BLD AUTO: 0.2 % — SIGNIFICANT CHANGE UP (ref 0–2)
BILIRUB SERPL-MCNC: 0.2 MG/DL — SIGNIFICANT CHANGE UP (ref 0.2–1.2)
BILIRUB SERPL-MCNC: 0.2 MG/DL — SIGNIFICANT CHANGE UP (ref 0.2–1.2)
BILIRUB SERPL-MCNC: 0.4 MG/DL — SIGNIFICANT CHANGE UP (ref 0.2–1.2)
BILIRUB SERPL-MCNC: 0.4 MG/DL — SIGNIFICANT CHANGE UP (ref 0.2–1.2)
BUN SERPL-MCNC: 9 MG/DL — SIGNIFICANT CHANGE UP (ref 7–23)
CALCIUM SERPL-MCNC: 7.6 MG/DL — LOW (ref 8.4–10.5)
CALCIUM SERPL-MCNC: 7.6 MG/DL — LOW (ref 8.4–10.5)
CALCIUM SERPL-MCNC: 7.8 MG/DL — LOW (ref 8.4–10.5)
CALCIUM SERPL-MCNC: 7.8 MG/DL — LOW (ref 8.4–10.5)
CHLORIDE SERPL-SCNC: 93 MMOL/L — LOW (ref 98–107)
CHLORIDE SERPL-SCNC: 93 MMOL/L — LOW (ref 98–107)
CHLORIDE SERPL-SCNC: 97 MMOL/L — LOW (ref 98–107)
CHLORIDE SERPL-SCNC: 97 MMOL/L — LOW (ref 98–107)
CO2 SERPL-SCNC: 17 MMOL/L — LOW (ref 22–31)
CO2 SERPL-SCNC: 17 MMOL/L — LOW (ref 22–31)
CO2 SERPL-SCNC: 19 MMOL/L — LOW (ref 22–31)
CO2 SERPL-SCNC: 19 MMOL/L — LOW (ref 22–31)
CREAT SERPL-MCNC: 0.64 MG/DL — SIGNIFICANT CHANGE UP (ref 0.5–1.3)
CREAT SERPL-MCNC: 0.64 MG/DL — SIGNIFICANT CHANGE UP (ref 0.5–1.3)
CREAT SERPL-MCNC: 0.9 MG/DL — SIGNIFICANT CHANGE UP (ref 0.5–1.3)
CREAT SERPL-MCNC: 0.9 MG/DL — SIGNIFICANT CHANGE UP (ref 0.5–1.3)
EGFR: 115 ML/MIN/1.73M2 — SIGNIFICANT CHANGE UP
EGFR: 115 ML/MIN/1.73M2 — SIGNIFICANT CHANGE UP
EGFR: 83 ML/MIN/1.73M2 — SIGNIFICANT CHANGE UP
EGFR: 83 ML/MIN/1.73M2 — SIGNIFICANT CHANGE UP
EOSINOPHIL # BLD AUTO: 0.01 K/UL — SIGNIFICANT CHANGE UP (ref 0–0.5)
EOSINOPHIL # BLD AUTO: 0.01 K/UL — SIGNIFICANT CHANGE UP (ref 0–0.5)
EOSINOPHIL # BLD AUTO: 0.02 K/UL — SIGNIFICANT CHANGE UP (ref 0–0.5)
EOSINOPHIL # BLD AUTO: 0.02 K/UL — SIGNIFICANT CHANGE UP (ref 0–0.5)
EOSINOPHIL NFR BLD AUTO: 0.1 % — SIGNIFICANT CHANGE UP (ref 0–6)
FIBRINOGEN PPP-MCNC: 621 MG/DL — HIGH (ref 200–465)
FIBRINOGEN PPP-MCNC: 621 MG/DL — HIGH (ref 200–465)
GLUCOSE BLDC GLUCOMTR-MCNC: 102 MG/DL — HIGH (ref 70–99)
GLUCOSE BLDC GLUCOMTR-MCNC: 102 MG/DL — HIGH (ref 70–99)
GLUCOSE BLDC GLUCOMTR-MCNC: 111 MG/DL — HIGH (ref 70–99)
GLUCOSE BLDC GLUCOMTR-MCNC: 111 MG/DL — HIGH (ref 70–99)
GLUCOSE BLDC GLUCOMTR-MCNC: 115 MG/DL — HIGH (ref 70–99)
GLUCOSE BLDC GLUCOMTR-MCNC: 115 MG/DL — HIGH (ref 70–99)
GLUCOSE BLDC GLUCOMTR-MCNC: 116 MG/DL — HIGH (ref 70–99)
GLUCOSE BLDC GLUCOMTR-MCNC: 116 MG/DL — HIGH (ref 70–99)
GLUCOSE BLDC GLUCOMTR-MCNC: 118 MG/DL — HIGH (ref 70–99)
GLUCOSE BLDC GLUCOMTR-MCNC: 118 MG/DL — HIGH (ref 70–99)
GLUCOSE BLDC GLUCOMTR-MCNC: 120 MG/DL — HIGH (ref 70–99)
GLUCOSE BLDC GLUCOMTR-MCNC: 120 MG/DL — HIGH (ref 70–99)
GLUCOSE BLDC GLUCOMTR-MCNC: 127 MG/DL — HIGH (ref 70–99)
GLUCOSE BLDC GLUCOMTR-MCNC: 127 MG/DL — HIGH (ref 70–99)
GLUCOSE BLDC GLUCOMTR-MCNC: 130 MG/DL — HIGH (ref 70–99)
GLUCOSE BLDC GLUCOMTR-MCNC: 130 MG/DL — HIGH (ref 70–99)
GLUCOSE BLDC GLUCOMTR-MCNC: 131 MG/DL — HIGH (ref 70–99)
GLUCOSE BLDC GLUCOMTR-MCNC: 131 MG/DL — HIGH (ref 70–99)
GLUCOSE BLDC GLUCOMTR-MCNC: 138 MG/DL — HIGH (ref 70–99)
GLUCOSE BLDC GLUCOMTR-MCNC: 138 MG/DL — HIGH (ref 70–99)
GLUCOSE BLDC GLUCOMTR-MCNC: 141 MG/DL — HIGH (ref 70–99)
GLUCOSE BLDC GLUCOMTR-MCNC: 141 MG/DL — HIGH (ref 70–99)
GLUCOSE BLDC GLUCOMTR-MCNC: 181 MG/DL — HIGH (ref 70–99)
GLUCOSE BLDC GLUCOMTR-MCNC: 181 MG/DL — HIGH (ref 70–99)
GLUCOSE BLDC GLUCOMTR-MCNC: 191 MG/DL — HIGH (ref 70–99)
GLUCOSE BLDC GLUCOMTR-MCNC: 191 MG/DL — HIGH (ref 70–99)
GLUCOSE BLDC GLUCOMTR-MCNC: 196 MG/DL — HIGH (ref 70–99)
GLUCOSE BLDC GLUCOMTR-MCNC: 196 MG/DL — HIGH (ref 70–99)
GLUCOSE BLDC GLUCOMTR-MCNC: 85 MG/DL — SIGNIFICANT CHANGE UP (ref 70–99)
GLUCOSE BLDC GLUCOMTR-MCNC: 90 MG/DL — SIGNIFICANT CHANGE UP (ref 70–99)
GLUCOSE BLDC GLUCOMTR-MCNC: 90 MG/DL — SIGNIFICANT CHANGE UP (ref 70–99)
GLUCOSE BLDC GLUCOMTR-MCNC: 93 MG/DL — SIGNIFICANT CHANGE UP (ref 70–99)
GLUCOSE BLDC GLUCOMTR-MCNC: 93 MG/DL — SIGNIFICANT CHANGE UP (ref 70–99)
GLUCOSE BLDC GLUCOMTR-MCNC: 98 MG/DL — SIGNIFICANT CHANGE UP (ref 70–99)
GLUCOSE BLDC GLUCOMTR-MCNC: 98 MG/DL — SIGNIFICANT CHANGE UP (ref 70–99)
GLUCOSE SERPL-MCNC: 115 MG/DL — HIGH (ref 70–99)
GLUCOSE SERPL-MCNC: 115 MG/DL — HIGH (ref 70–99)
GLUCOSE SERPL-MCNC: 122 MG/DL — HIGH (ref 70–99)
GLUCOSE SERPL-MCNC: 122 MG/DL — HIGH (ref 70–99)
HCT VFR BLD CALC: 34.5 % — SIGNIFICANT CHANGE UP (ref 34.5–45)
HCT VFR BLD CALC: 34.5 % — SIGNIFICANT CHANGE UP (ref 34.5–45)
HCT VFR BLD CALC: 35.6 % — SIGNIFICANT CHANGE UP (ref 34.5–45)
HCT VFR BLD CALC: 35.6 % — SIGNIFICANT CHANGE UP (ref 34.5–45)
HGB BLD-MCNC: 11.7 G/DL — SIGNIFICANT CHANGE UP (ref 11.5–15.5)
HGB BLD-MCNC: 11.7 G/DL — SIGNIFICANT CHANGE UP (ref 11.5–15.5)
HGB BLD-MCNC: 12.1 G/DL — SIGNIFICANT CHANGE UP (ref 11.5–15.5)
HGB BLD-MCNC: 12.1 G/DL — SIGNIFICANT CHANGE UP (ref 11.5–15.5)
IANC: 14.87 K/UL — HIGH (ref 1.8–7.4)
IANC: 14.87 K/UL — HIGH (ref 1.8–7.4)
IANC: 16.74 K/UL — HIGH (ref 1.8–7.4)
IANC: 16.74 K/UL — HIGH (ref 1.8–7.4)
IMM GRANULOCYTES NFR BLD AUTO: 0.5 % — SIGNIFICANT CHANGE UP (ref 0–0.9)
IMM GRANULOCYTES NFR BLD AUTO: 0.5 % — SIGNIFICANT CHANGE UP (ref 0–0.9)
IMM GRANULOCYTES NFR BLD AUTO: 0.6 % — SIGNIFICANT CHANGE UP (ref 0–0.9)
IMM GRANULOCYTES NFR BLD AUTO: 0.6 % — SIGNIFICANT CHANGE UP (ref 0–0.9)
INR BLD: 0.9 RATIO — SIGNIFICANT CHANGE UP (ref 0.85–1.18)
INR BLD: 0.9 RATIO — SIGNIFICANT CHANGE UP (ref 0.85–1.18)
LDH SERPL L TO P-CCNC: 185 U/L — SIGNIFICANT CHANGE UP (ref 135–225)
LDH SERPL L TO P-CCNC: 185 U/L — SIGNIFICANT CHANGE UP (ref 135–225)
LDH SERPL L TO P-CCNC: 199 U/L — SIGNIFICANT CHANGE UP (ref 135–225)
LDH SERPL L TO P-CCNC: 199 U/L — SIGNIFICANT CHANGE UP (ref 135–225)
LYMPHOCYTES # BLD AUTO: 0.81 K/UL — LOW (ref 1–3.3)
LYMPHOCYTES # BLD AUTO: 0.81 K/UL — LOW (ref 1–3.3)
LYMPHOCYTES # BLD AUTO: 1 K/UL — SIGNIFICANT CHANGE UP (ref 1–3.3)
LYMPHOCYTES # BLD AUTO: 1 K/UL — SIGNIFICANT CHANGE UP (ref 1–3.3)
LYMPHOCYTES # BLD AUTO: 4.9 % — LOW (ref 13–44)
LYMPHOCYTES # BLD AUTO: 4.9 % — LOW (ref 13–44)
LYMPHOCYTES # BLD AUTO: 5.3 % — LOW (ref 13–44)
LYMPHOCYTES # BLD AUTO: 5.3 % — LOW (ref 13–44)
MAGNESIUM SERPL-MCNC: 5.2 MG/DL — HIGH (ref 1.6–2.6)
MAGNESIUM SERPL-MCNC: 5.2 MG/DL — HIGH (ref 1.6–2.6)
MAGNESIUM SERPL-MCNC: 6 MG/DL — HIGH (ref 1.6–2.6)
MAGNESIUM SERPL-MCNC: 6 MG/DL — HIGH (ref 1.6–2.6)
MAGNESIUM SERPL-MCNC: 6.4 MG/DL — HIGH (ref 1.6–2.6)
MAGNESIUM SERPL-MCNC: 6.4 MG/DL — HIGH (ref 1.6–2.6)
MAGNESIUM SERPL-MCNC: 6.7 MG/DL — HIGH (ref 1.6–2.6)
MAGNESIUM SERPL-MCNC: 6.7 MG/DL — HIGH (ref 1.6–2.6)
MCHC RBC-ENTMCNC: 27.9 PG — SIGNIFICANT CHANGE UP (ref 27–34)
MCHC RBC-ENTMCNC: 27.9 PG — SIGNIFICANT CHANGE UP (ref 27–34)
MCHC RBC-ENTMCNC: 28.1 PG — SIGNIFICANT CHANGE UP (ref 27–34)
MCHC RBC-ENTMCNC: 28.1 PG — SIGNIFICANT CHANGE UP (ref 27–34)
MCHC RBC-ENTMCNC: 33.9 GM/DL — SIGNIFICANT CHANGE UP (ref 32–36)
MCHC RBC-ENTMCNC: 33.9 GM/DL — SIGNIFICANT CHANGE UP (ref 32–36)
MCHC RBC-ENTMCNC: 34 GM/DL — SIGNIFICANT CHANGE UP (ref 32–36)
MCHC RBC-ENTMCNC: 34 GM/DL — SIGNIFICANT CHANGE UP (ref 32–36)
MCV RBC AUTO: 82.3 FL — SIGNIFICANT CHANGE UP (ref 80–100)
MCV RBC AUTO: 82.3 FL — SIGNIFICANT CHANGE UP (ref 80–100)
MCV RBC AUTO: 82.8 FL — SIGNIFICANT CHANGE UP (ref 80–100)
MCV RBC AUTO: 82.8 FL — SIGNIFICANT CHANGE UP (ref 80–100)
MONOCYTES # BLD AUTO: 0.58 K/UL — SIGNIFICANT CHANGE UP (ref 0–0.9)
MONOCYTES # BLD AUTO: 0.58 K/UL — SIGNIFICANT CHANGE UP (ref 0–0.9)
MONOCYTES # BLD AUTO: 0.81 K/UL — SIGNIFICANT CHANGE UP (ref 0–0.9)
MONOCYTES # BLD AUTO: 0.81 K/UL — SIGNIFICANT CHANGE UP (ref 0–0.9)
MONOCYTES NFR BLD AUTO: 3.5 % — SIGNIFICANT CHANGE UP (ref 2–14)
MONOCYTES NFR BLD AUTO: 3.5 % — SIGNIFICANT CHANGE UP (ref 2–14)
MONOCYTES NFR BLD AUTO: 4.3 % — SIGNIFICANT CHANGE UP (ref 2–14)
MONOCYTES NFR BLD AUTO: 4.3 % — SIGNIFICANT CHANGE UP (ref 2–14)
NEUTROPHILS # BLD AUTO: 14.87 K/UL — HIGH (ref 1.8–7.4)
NEUTROPHILS # BLD AUTO: 14.87 K/UL — HIGH (ref 1.8–7.4)
NEUTROPHILS # BLD AUTO: 16.74 K/UL — HIGH (ref 1.8–7.4)
NEUTROPHILS # BLD AUTO: 16.74 K/UL — HIGH (ref 1.8–7.4)
NEUTROPHILS NFR BLD AUTO: 89.5 % — HIGH (ref 43–77)
NEUTROPHILS NFR BLD AUTO: 89.5 % — HIGH (ref 43–77)
NEUTROPHILS NFR BLD AUTO: 90.9 % — HIGH (ref 43–77)
NEUTROPHILS NFR BLD AUTO: 90.9 % — HIGH (ref 43–77)
NRBC # BLD: 0 /100 WBCS — SIGNIFICANT CHANGE UP (ref 0–0)
NRBC # FLD: 0 K/UL — SIGNIFICANT CHANGE UP (ref 0–0)
PLATELET # BLD AUTO: 203 K/UL — SIGNIFICANT CHANGE UP (ref 150–400)
PLATELET # BLD AUTO: 203 K/UL — SIGNIFICANT CHANGE UP (ref 150–400)
PLATELET # BLD AUTO: 236 K/UL — SIGNIFICANT CHANGE UP (ref 150–400)
PLATELET # BLD AUTO: 236 K/UL — SIGNIFICANT CHANGE UP (ref 150–400)
POTASSIUM SERPL-MCNC: 3.6 MMOL/L — SIGNIFICANT CHANGE UP (ref 3.5–5.3)
POTASSIUM SERPL-MCNC: 3.6 MMOL/L — SIGNIFICANT CHANGE UP (ref 3.5–5.3)
POTASSIUM SERPL-MCNC: 4.2 MMOL/L — SIGNIFICANT CHANGE UP (ref 3.5–5.3)
POTASSIUM SERPL-MCNC: 4.2 MMOL/L — SIGNIFICANT CHANGE UP (ref 3.5–5.3)
POTASSIUM SERPL-SCNC: 3.6 MMOL/L — SIGNIFICANT CHANGE UP (ref 3.5–5.3)
POTASSIUM SERPL-SCNC: 3.6 MMOL/L — SIGNIFICANT CHANGE UP (ref 3.5–5.3)
POTASSIUM SERPL-SCNC: 4.2 MMOL/L — SIGNIFICANT CHANGE UP (ref 3.5–5.3)
POTASSIUM SERPL-SCNC: 4.2 MMOL/L — SIGNIFICANT CHANGE UP (ref 3.5–5.3)
PROT SERPL-MCNC: 6.5 G/DL — SIGNIFICANT CHANGE UP (ref 6–8.3)
PROT SERPL-MCNC: 6.5 G/DL — SIGNIFICANT CHANGE UP (ref 6–8.3)
PROT SERPL-MCNC: 6.7 G/DL — SIGNIFICANT CHANGE UP (ref 6–8.3)
PROT SERPL-MCNC: 6.7 G/DL — SIGNIFICANT CHANGE UP (ref 6–8.3)
PROTHROM AB SERPL-ACNC: 10.2 SEC — SIGNIFICANT CHANGE UP (ref 9.5–13)
PROTHROM AB SERPL-ACNC: 10.2 SEC — SIGNIFICANT CHANGE UP (ref 9.5–13)
RBC # BLD: 4.19 M/UL — SIGNIFICANT CHANGE UP (ref 3.8–5.2)
RBC # BLD: 4.19 M/UL — SIGNIFICANT CHANGE UP (ref 3.8–5.2)
RBC # BLD: 4.3 M/UL — SIGNIFICANT CHANGE UP (ref 3.8–5.2)
RBC # BLD: 4.3 M/UL — SIGNIFICANT CHANGE UP (ref 3.8–5.2)
RBC # FLD: 14.9 % — HIGH (ref 10.3–14.5)
RBC # FLD: 14.9 % — HIGH (ref 10.3–14.5)
RBC # FLD: 15.1 % — HIGH (ref 10.3–14.5)
RBC # FLD: 15.1 % — HIGH (ref 10.3–14.5)
SODIUM SERPL-SCNC: 125 MMOL/L — LOW (ref 135–145)
SODIUM SERPL-SCNC: 125 MMOL/L — LOW (ref 135–145)
SODIUM SERPL-SCNC: 130 MMOL/L — LOW (ref 135–145)
SODIUM SERPL-SCNC: 130 MMOL/L — LOW (ref 135–145)
T PALLIDUM AB TITR SER: NEGATIVE — SIGNIFICANT CHANGE UP
T PALLIDUM AB TITR SER: NEGATIVE — SIGNIFICANT CHANGE UP
URATE SERPL-MCNC: 5.8 MG/DL — SIGNIFICANT CHANGE UP (ref 2.5–7)
URATE SERPL-MCNC: 5.8 MG/DL — SIGNIFICANT CHANGE UP (ref 2.5–7)
URATE SERPL-MCNC: 6.1 MG/DL — SIGNIFICANT CHANGE UP (ref 2.5–7)
URATE SERPL-MCNC: 6.1 MG/DL — SIGNIFICANT CHANGE UP (ref 2.5–7)
WBC # BLD: 16.37 K/UL — HIGH (ref 3.8–10.5)
WBC # BLD: 16.37 K/UL — HIGH (ref 3.8–10.5)
WBC # BLD: 18.72 K/UL — HIGH (ref 3.8–10.5)
WBC # BLD: 18.72 K/UL — HIGH (ref 3.8–10.5)
WBC # FLD AUTO: 16.37 K/UL — HIGH (ref 3.8–10.5)
WBC # FLD AUTO: 16.37 K/UL — HIGH (ref 3.8–10.5)
WBC # FLD AUTO: 18.72 K/UL — HIGH (ref 3.8–10.5)
WBC # FLD AUTO: 18.72 K/UL — HIGH (ref 3.8–10.5)

## 2023-11-08 PROCEDURE — 88307 TISSUE EXAM BY PATHOLOGIST: CPT | Mod: 26

## 2023-11-08 DEVICE — ARISTA 1GR: Type: IMPLANTABLE DEVICE | Status: FUNCTIONAL

## 2023-11-08 RX ORDER — MAGNESIUM HYDROXIDE 400 MG/1
30 TABLET, CHEWABLE ORAL
Refills: 0 | Status: DISCONTINUED | OUTPATIENT
Start: 2023-11-09 | End: 2023-11-12

## 2023-11-08 RX ORDER — ONDANSETRON 8 MG/1
4 TABLET, FILM COATED ORAL EVERY 6 HOURS
Refills: 0 | Status: DISCONTINUED | OUTPATIENT
Start: 2023-11-09 | End: 2023-11-12

## 2023-11-08 RX ORDER — FAMOTIDINE 10 MG/ML
20 INJECTION INTRAVENOUS ONCE
Refills: 0 | Status: DISCONTINUED | OUTPATIENT
Start: 2023-11-08 | End: 2023-11-08

## 2023-11-08 RX ORDER — AER TRAVELER 0.5 G/1
1 SOLUTION RECTAL; TOPICAL EVERY 4 HOURS
Refills: 0 | Status: DISCONTINUED | OUTPATIENT
Start: 2023-11-08 | End: 2023-11-08

## 2023-11-08 RX ORDER — ACETAMINOPHEN 500 MG
975 TABLET ORAL
Refills: 0 | Status: DISCONTINUED | OUTPATIENT
Start: 2023-11-08 | End: 2023-11-08

## 2023-11-08 RX ORDER — OXYTOCIN 10 UNIT/ML
333.33 VIAL (ML) INJECTION
Qty: 20 | Refills: 0 | Status: DISCONTINUED | OUTPATIENT
Start: 2023-11-09 | End: 2023-11-09

## 2023-11-08 RX ORDER — SODIUM CHLORIDE 9 MG/ML
1000 INJECTION INTRAMUSCULAR; INTRAVENOUS; SUBCUTANEOUS
Refills: 0 | Status: DISCONTINUED | OUTPATIENT
Start: 2023-11-08 | End: 2023-11-08

## 2023-11-08 RX ORDER — SODIUM CHLORIDE 9 MG/ML
300 INJECTION INTRAMUSCULAR; INTRAVENOUS; SUBCUTANEOUS ONCE
Refills: 0 | Status: COMPLETED | OUTPATIENT
Start: 2023-11-08 | End: 2023-11-08

## 2023-11-08 RX ORDER — DEXTROSE 50 % IN WATER 50 %
25 SYRINGE (ML) INTRAVENOUS
Refills: 0 | Status: DISCONTINUED | OUTPATIENT
Start: 2023-11-08 | End: 2023-11-08

## 2023-11-08 RX ORDER — KETOROLAC TROMETHAMINE 30 MG/ML
30 SYRINGE (ML) INJECTION EVERY 6 HOURS
Refills: 0 | Status: DISCONTINUED | OUTPATIENT
Start: 2023-11-09 | End: 2023-11-10

## 2023-11-08 RX ORDER — SODIUM CHLORIDE 9 MG/ML
1000 INJECTION, SOLUTION INTRAVENOUS
Refills: 0 | Status: DISCONTINUED | OUTPATIENT
Start: 2023-11-08 | End: 2023-11-08

## 2023-11-08 RX ORDER — MAGNESIUM HYDROXIDE 400 MG/1
30 TABLET, CHEWABLE ORAL
Refills: 0 | Status: DISCONTINUED | OUTPATIENT
Start: 2023-11-08 | End: 2023-11-08

## 2023-11-08 RX ORDER — LANOLIN
1 OINTMENT (GRAM) TOPICAL EVERY 6 HOURS
Refills: 0 | Status: DISCONTINUED | OUTPATIENT
Start: 2023-11-08 | End: 2023-11-08

## 2023-11-08 RX ORDER — LEVOTHYROXINE SODIUM 125 MCG
175 TABLET ORAL DAILY
Refills: 0 | Status: DISCONTINUED | OUTPATIENT
Start: 2023-11-08 | End: 2023-11-08

## 2023-11-08 RX ORDER — DEXTROSE 50 % IN WATER 50 %
50 SYRINGE (ML) INTRAVENOUS
Refills: 0 | Status: DISCONTINUED | OUTPATIENT
Start: 2023-11-08 | End: 2023-11-08

## 2023-11-08 RX ORDER — DEXAMETHASONE 0.5 MG/5ML
4 ELIXIR ORAL EVERY 6 HOURS
Refills: 0 | Status: DISCONTINUED | OUTPATIENT
Start: 2023-11-09 | End: 2023-11-12

## 2023-11-08 RX ORDER — OXYCODONE HYDROCHLORIDE 5 MG/1
5 TABLET ORAL
Refills: 0 | Status: DISCONTINUED | OUTPATIENT
Start: 2023-11-09 | End: 2023-11-09

## 2023-11-08 RX ORDER — DIPHENHYDRAMINE HCL 50 MG
25 CAPSULE ORAL EVERY 6 HOURS
Refills: 0 | Status: DISCONTINUED | OUTPATIENT
Start: 2023-11-09 | End: 2023-11-12

## 2023-11-08 RX ORDER — DIBUCAINE 1 %
1 OINTMENT (GRAM) RECTAL EVERY 6 HOURS
Refills: 0 | Status: DISCONTINUED | OUTPATIENT
Start: 2023-11-08 | End: 2023-11-08

## 2023-11-08 RX ORDER — NALOXONE HYDROCHLORIDE 4 MG/.1ML
0.1 SPRAY NASAL
Refills: 0 | Status: DISCONTINUED | OUTPATIENT
Start: 2023-11-09 | End: 2023-11-12

## 2023-11-08 RX ORDER — INSULIN HUMAN 100 [IU]/ML
3 INJECTION, SOLUTION SUBCUTANEOUS ONCE
Refills: 0 | Status: DISCONTINUED | OUTPATIENT
Start: 2023-11-08 | End: 2023-11-08

## 2023-11-08 RX ORDER — INSULIN HUMAN 100 [IU]/ML
2 INJECTION, SOLUTION SUBCUTANEOUS
Qty: 100 | Refills: 0 | Status: DISCONTINUED | OUTPATIENT
Start: 2023-11-08 | End: 2023-11-08

## 2023-11-08 RX ORDER — CITRIC ACID/SODIUM CITRATE 300-500 MG
30 SOLUTION, ORAL ORAL ONCE
Refills: 0 | Status: DISCONTINUED | OUTPATIENT
Start: 2023-11-08 | End: 2023-11-08

## 2023-11-08 RX ORDER — OXYCODONE HYDROCHLORIDE 5 MG/1
5 TABLET ORAL
Refills: 0 | Status: DISCONTINUED | OUTPATIENT
Start: 2023-11-09 | End: 2023-11-12

## 2023-11-08 RX ORDER — DEXTROSE 50 % IN WATER 50 %
15 SYRINGE (ML) INTRAVENOUS ONCE
Refills: 0 | Status: DISCONTINUED | OUTPATIENT
Start: 2023-11-08 | End: 2023-11-08

## 2023-11-08 RX ORDER — OXYTOCIN 10 UNIT/ML
41.67 VIAL (ML) INJECTION
Qty: 20 | Refills: 0 | Status: DISCONTINUED | OUTPATIENT
Start: 2023-11-08 | End: 2023-11-08

## 2023-11-08 RX ORDER — SIMETHICONE 80 MG/1
80 TABLET, CHEWABLE ORAL EVERY 4 HOURS
Refills: 0 | Status: DISCONTINUED | OUTPATIENT
Start: 2023-11-09 | End: 2023-11-12

## 2023-11-08 RX ORDER — OXYCODONE HYDROCHLORIDE 5 MG/1
10 TABLET ORAL
Refills: 0 | Status: DISCONTINUED | OUTPATIENT
Start: 2023-11-09 | End: 2023-11-09

## 2023-11-08 RX ORDER — IBUPROFEN 200 MG
600 TABLET ORAL EVERY 6 HOURS
Refills: 0 | Status: COMPLETED | OUTPATIENT
Start: 2023-11-09 | End: 2024-10-07

## 2023-11-08 RX ORDER — SODIUM CHLORIDE 9 MG/ML
3 INJECTION INTRAMUSCULAR; INTRAVENOUS; SUBCUTANEOUS EVERY 8 HOURS
Refills: 0 | Status: DISCONTINUED | OUTPATIENT
Start: 2023-11-08 | End: 2023-11-08

## 2023-11-08 RX ORDER — KETOROLAC TROMETHAMINE 30 MG/ML
30 SYRINGE (ML) INJECTION ONCE
Refills: 0 | Status: DISCONTINUED | OUTPATIENT
Start: 2023-11-08 | End: 2023-11-08

## 2023-11-08 RX ORDER — HYDROCORTISONE 1 %
1 OINTMENT (GRAM) TOPICAL EVERY 6 HOURS
Refills: 0 | Status: DISCONTINUED | OUTPATIENT
Start: 2023-11-08 | End: 2023-11-08

## 2023-11-08 RX ORDER — LANOLIN
1 OINTMENT (GRAM) TOPICAL EVERY 6 HOURS
Refills: 0 | Status: DISCONTINUED | OUTPATIENT
Start: 2023-11-09 | End: 2023-11-12

## 2023-11-08 RX ORDER — NALBUPHINE HYDROCHLORIDE 10 MG/ML
2.5 INJECTION, SOLUTION INTRAMUSCULAR; INTRAVENOUS; SUBCUTANEOUS EVERY 6 HOURS
Refills: 0 | Status: DISCONTINUED | OUTPATIENT
Start: 2023-11-09 | End: 2023-11-12

## 2023-11-08 RX ORDER — OXYCODONE HYDROCHLORIDE 5 MG/1
5 TABLET ORAL ONCE
Refills: 0 | Status: DISCONTINUED | OUTPATIENT
Start: 2023-11-08 | End: 2023-11-08

## 2023-11-08 RX ORDER — HEPARIN SODIUM 5000 [USP'U]/ML
1000 INJECTION INTRAVENOUS; SUBCUTANEOUS EVERY 12 HOURS
Refills: 0 | Status: DISCONTINUED | OUTPATIENT
Start: 2023-11-09 | End: 2023-11-09

## 2023-11-08 RX ORDER — OXYCODONE HYDROCHLORIDE 5 MG/1
5 TABLET ORAL ONCE
Refills: 0 | Status: DISCONTINUED | OUTPATIENT
Start: 2023-11-09 | End: 2023-11-12

## 2023-11-08 RX ORDER — PRAMOXINE HYDROCHLORIDE 150 MG/15G
1 AEROSOL, FOAM RECTAL EVERY 4 HOURS
Refills: 0 | Status: DISCONTINUED | OUTPATIENT
Start: 2023-11-08 | End: 2023-11-08

## 2023-11-08 RX ORDER — TETANUS TOXOID, REDUCED DIPHTHERIA TOXOID AND ACELLULAR PERTUSSIS VACCINE, ADSORBED 5; 2.5; 8; 8; 2.5 [IU]/.5ML; [IU]/.5ML; UG/.5ML; UG/.5ML; UG/.5ML
0.5 SUSPENSION INTRAMUSCULAR ONCE
Refills: 0 | Status: DISCONTINUED | OUTPATIENT
Start: 2023-11-08 | End: 2023-11-08

## 2023-11-08 RX ORDER — SIMETHICONE 80 MG/1
80 TABLET, CHEWABLE ORAL EVERY 4 HOURS
Refills: 0 | Status: DISCONTINUED | OUTPATIENT
Start: 2023-11-08 | End: 2023-11-08

## 2023-11-08 RX ORDER — ACETAMINOPHEN 500 MG
975 TABLET ORAL
Refills: 0 | Status: DISCONTINUED | OUTPATIENT
Start: 2023-11-09 | End: 2023-11-12

## 2023-11-08 RX ORDER — IBUPROFEN 200 MG
600 TABLET ORAL EVERY 6 HOURS
Refills: 0 | Status: DISCONTINUED | OUTPATIENT
Start: 2023-11-08 | End: 2023-11-08

## 2023-11-08 RX ORDER — DIPHENHYDRAMINE HCL 50 MG
25 CAPSULE ORAL EVERY 6 HOURS
Refills: 0 | Status: DISCONTINUED | OUTPATIENT
Start: 2023-11-08 | End: 2023-11-08

## 2023-11-08 RX ORDER — OXYCODONE HYDROCHLORIDE 5 MG/1
5 TABLET ORAL
Refills: 0 | Status: DISCONTINUED | OUTPATIENT
Start: 2023-11-08 | End: 2023-11-08

## 2023-11-08 RX ORDER — BENZOCAINE 10 %
1 GEL (GRAM) MUCOUS MEMBRANE EVERY 6 HOURS
Refills: 0 | Status: DISCONTINUED | OUTPATIENT
Start: 2023-11-08 | End: 2023-11-08

## 2023-11-08 RX ORDER — TETANUS TOXOID, REDUCED DIPHTHERIA TOXOID AND ACELLULAR PERTUSSIS VACCINE, ADSORBED 5; 2.5; 8; 8; 2.5 [IU]/.5ML; [IU]/.5ML; UG/.5ML; UG/.5ML; UG/.5ML
0.5 SUSPENSION INTRAMUSCULAR ONCE
Refills: 0 | Status: COMPLETED | OUTPATIENT
Start: 2023-11-09

## 2023-11-08 RX ORDER — MAGNESIUM SULFATE 500 MG/ML
1.5 VIAL (ML) INJECTION
Qty: 40 | Refills: 0 | Status: DISCONTINUED | OUTPATIENT
Start: 2023-11-08 | End: 2023-11-12

## 2023-11-08 RX ORDER — INSULIN HUMAN 100 [IU]/ML
1 INJECTION, SOLUTION SUBCUTANEOUS
Qty: 100 | Refills: 0 | Status: DISCONTINUED | OUTPATIENT
Start: 2023-11-08 | End: 2023-11-08

## 2023-11-08 RX ORDER — CITRIC ACID/SODIUM CITRATE 300-500 MG
15 SOLUTION, ORAL ORAL ONCE
Refills: 0 | Status: DISCONTINUED | OUTPATIENT
Start: 2023-11-08 | End: 2023-11-08

## 2023-11-08 RX ADMIN — CHLORHEXIDINE GLUCONATE 1 APPLICATION(S): 213 SOLUTION TOPICAL at 18:24

## 2023-11-08 RX ADMIN — Medication 2 MILLIUNIT(S)/MIN: at 02:19

## 2023-11-08 RX ADMIN — SODIUM CHLORIDE 600 MILLILITER(S): 9 INJECTION INTRAMUSCULAR; INTRAVENOUS; SUBCUTANEOUS at 07:03

## 2023-11-08 RX ADMIN — INSULIN HUMAN 1 UNIT(S)/HR: 100 INJECTION, SOLUTION SUBCUTANEOUS at 05:34

## 2023-11-08 RX ADMIN — Medication 0.25 MILLIGRAM(S): at 06:50

## 2023-11-08 RX ADMIN — Medication 250 MILLIGRAM(S): at 09:25

## 2023-11-08 RX ADMIN — SODIUM CHLORIDE 25 MILLILITER(S): 9 INJECTION, SOLUTION INTRAVENOUS at 18:26

## 2023-11-08 RX ADMIN — SODIUM CHLORIDE 125 MILLILITER(S): 9 INJECTION INTRAMUSCULAR; INTRAVENOUS; SUBCUTANEOUS at 07:39

## 2023-11-08 RX ADMIN — Medication 175 MICROGRAM(S): at 09:25

## 2023-11-08 RX ADMIN — FAMOTIDINE 20 MILLIGRAM(S): 10 INJECTION INTRAVENOUS at 21:10

## 2023-11-08 RX ADMIN — Medication 250 MILLIGRAM(S): at 18:21

## 2023-11-08 RX ADMIN — SODIUM CHLORIDE 25 MILLILITER(S): 9 INJECTION, SOLUTION INTRAVENOUS at 19:26

## 2023-11-08 RX ADMIN — Medication 50 GM/HR: at 19:25

## 2023-11-08 RX ADMIN — Medication 15 MILLILITER(S): at 21:10

## 2023-11-08 RX ADMIN — Medication 250 MILLIGRAM(S): at 01:05

## 2023-11-08 RX ADMIN — Medication 50 GM/HR: at 07:24

## 2023-11-08 NOTE — OB PROVIDER DELIVERY SUMMARY - NSSELHIDDEN_OBGYN_ALL_OB_FT
[NS_DeliveryAttending1_OBGYN_ALL_OB_FT:[id number]],[NS_DeliveryAssist1_OBGYN_ALL_OB_FT:[id number]],[NS_DeliveryRN_OBGYN_ALL_OB_FT:[id number]]

## 2023-11-08 NOTE — CHART NOTE - NSCHARTNOTEFT_GEN_A_CORE
pt feeling intermittent pressure in her vagina    Vital Signs Last 24 Hrs  T(C): 36.7 (08 Nov 2023 17:20), Max: 36.7 (07 Nov 2023 19:15)  T(F): 98.06 (08 Nov 2023 17:20), Max: 98.1 (07 Nov 2023 19:30)  HR: 87 (08 Nov 2023 18:13) (67 - 128)  BP: 127/65 (08 Nov 2023 17:55) (100/56 - 163/83)  RR: 16 (08 Nov 2023 17:20) (16 - 18)  SpO2: 99% (08 Nov 2023 18:13) (84% - 100%)    I&O's Summary    07 Nov 2023 07:01  -  08 Nov 2023 07:00  --------------------------------------------------------  IN: 975 mL / OUT: 1170 mL / NET: -195 mL    08 Nov 2023 07:01  -  08 Nov 2023 18:18  --------------------------------------------------------  IN: 2350 mL / OUT: 825 mL / NET: 1525 mL      ve 8/90/-2  fht 115, min variability, no decels. + accel with scalp stimulation  toco q2-5 min    urine noted to be blood tinged  recently smart catheter readjusted by MD    a/p 39wk p0 gdm, sPEC induction  scalp stimulation reassuring of fetal status  will send rpt chem panel, coags, cbc now  cont pitocin

## 2023-11-08 NOTE — OB RN DELIVERY SUMMARY - NS_SEPSISRSKCALC_OBGYN_ALL_OB_FT
EOS calculated successfully. EOS Risk Factor: 0.5/1000 live births ([facility] national incidence); GA=39w;Temp=98.42; ROM=24.783; GBS='Positive'; Antibiotics='Broad spectrum antibiotics 2-3.9 hrs prior to birth'

## 2023-11-08 NOTE — DISCHARGE NOTE OB - CARE PROVIDER_API CALL
Danielle Ying  Obstetrics and Gynecology  [address], [address]  [place] [address]  Phone: [phone]  Fax: [phone]  Established Patient  Follow Up Time: 2 weeks

## 2023-11-08 NOTE — OB PROVIDER LABOR PROGRESS NOTE - NS_SUBJECTIVE/OBJECTIVE_OBGYN_ALL_OB_FT
Reported by RN patients water broke. Seen and examined at bedside.   VS  T(C): 36.7 (11-07-23 @ 19:15)  HR: 95 (11-07-23 @ 21:23)  BP: 142/98 (11-07-23 @ 21:02)  RR: 18 (11-07-23 @ 19:15)  SpO2: 96% (11-07-23 @ 21:23)
Patient c/o of rectal pressure and contraction pain. Seen and examined at bedside.  VS  T(C): 36.7 (11-07-23 @ 19:15)  HR: 97 (11-08-23 @ 05:24)  BP: 108/- (11-08-23 @ 05:24)  RR: 18 (11-07-23 @ 19:15)  SpO2: 94% (11-08-23 @ 05:19)
Patient feels some pressure  VSS      EFW 7.5
Patient seen for IUPC and ISE placement 2/2 to recurrent variable decels  IUPC and ISE placed, prolonged 6 min decel to andrew 60s with positioning for exam  Pitocin paused, patient repositioned to R and L lateral sides  Terbutaline x1 administered
Patient seen and examined to assess for cervical change.   VS  T(C): 36.7 (11-08-23 @ 20:30)  HR: 114 (11-08-23 @ 21:13)  BP: 164/88 (11-08-23 @ 20:54)  RR: 17 (11-08-23 @ 20:30)  SpO2: 95% (11-08-23 @ 21:13)
Patient feeling pressure with ctx,  Pit @20   VSS  urine - bloody, adequate output  balloon deflated and adjusted  urethra appears normal    pelvis adequate by exam
pt seen and examined at bedside c/o increased pain and pressure

## 2023-11-08 NOTE — OB RN INTRAOPERATIVE NOTE - NSSELHIDDEN_OBGYN_ALL_OB_FT
[NS_DeliveryAttending1_OBGYN_ALL_OB_FT:[id number]] [NS_DeliveryAttending1_OBGYN_ALL_OB_FT:[email or web address]],[NS_DeliveryAssist1_OBGYN_ALL_OB_FT:[id number]],[NS_DeliveryRN_OBGYN_ALL_OB_FT:[id number]=]

## 2023-11-08 NOTE — OB NEONATOLOGY/PEDIATRICIAN DELIVERY SUMMARY - BABY A: APGAR 1 MIN COLOR, DELIVERY
Physical Therapy   Date: 12/2/2021  Patient did not attend nor call to cancel  today's (12/2/2021) scheduled appointment.  This is the patient's second no show/late cancel.      Voice message was left indicating:, - missed appointment, - date of next scheduled appointment , - attendance guidelines review  
(0) blue, pale

## 2023-11-08 NOTE — OB PROVIDER LABOR PROGRESS NOTE - NSVAGINALEXAM_OBGYN_ALL_OB_DT
08-Nov-2023 07:00
08-Nov-2023 20:40
07-Nov-2023 21:25
08-Nov-2023 11:51
08-Nov-2023 15:15
08-Nov-2023 05:20

## 2023-11-08 NOTE — DISCHARGE NOTE OB - CARE PROVIDERS DIRECT ADDRESSES
[email or web address]

## 2023-11-08 NOTE — DISCHARGE NOTE OB - MEDICATION SUMMARY - MEDICATIONS TO TAKE
I will START or STAY ON the medications listed below when I get home from the hospital:    Prenatal 1 oral capsule  -- 1 cap(s) by mouth once a day  -- Indication: For Supplement    Synthroid 175 mcg (0.175 mg) oral tablet  -- 1 tab(s) by mouth once a day  -- Indication: For thyroid supplement   I will START or STAY ON the medications listed below when I get home from the hospital:    ibuprofen 600 mg oral tablet  -- 1 tab(s) by mouth every 6 hours as needed for  moderate pain  -- Indication: For moderate pain    acetaminophen 325 mg oral tablet  -- 3 tab(s) by mouth every 6 hours as needed for  mild pain  -- Indication: For mild pain    Prenatal 1 oral capsule  -- 1 cap(s) by mouth once a day  -- Indication: For vitamins    Synthroid 175 mcg (0.175 mg) oral tablet  -- 1 tab(s) by mouth once a day  -- Indication: For hypothyroidism

## 2023-11-08 NOTE — OB NEONATOLOGY/PEDIATRICIAN DELIVERY SUMMARY - NSPEDSNEONOTESA_OBGYN_ALL_OB_FT
Pediatrician called to delivery for Cat 2. Male AGA infant born at  38.6 wks via  to a 40 y/o  blood type B+ mother. Maternal history of Hypothyroid on synthroid, Anxiety, SAB x 1, PEV on Mag, GDMA2.  Prenatal labs nr/immune/-, GBS + on 10/24 (tx'ed with Vanc, PCN allergy).  SROM at 2108 on  with clear fluids. EOS score 0.32, highest maternal temperature 36.9. Baby emerged vigorous, crying. Cord clamping delayed 60 sec. Infant was brought to radiant warmer and warmed, dried, stimulated and suctioned. HR>100, normal respiratory effort. APGARS of 8/9. Mom is initiating breast  feeding. Consents to Hepatitis B vaccination. Declines for infant to be circumcised.     BW: 2830g  :   TOB: 2155    Physical Exam:  Gen: no acute distress, +grimace  HEENT:  anterior fontanel open soft and flat, nondysmorphic facies, no cleft lip/palate, ears normal set, no ear pits or tags, nares clinically patent  Resp: Normal respiratory effort without grunting or retractions, good air entry b/l, clear to auscultation bilaterally  Cardio: Present S1/S2, regular rate and rhythm, no murmurs  Abd: soft, non tender, non distended, umbilical cord with 3 vessels  Neuro: +palmar and plantar grasp, +suck, +сергей, normal tone  Extremities: negative river and ortolani maneuvers, moving all extremities, no clavicular crepitus or stepoff  Skin: pink, warm  Genitals: Normal male anatomy, testicles palpable in scrotum b/l, Armani 1, anus patent Pediatrician called to delivery for Cat 2. Male AGA infant born at  38.6 wks via  to a 40 y/o  blood type B+ mother. Maternal history of Hypothyroid on synthroid, Anxiety, SAB x 1, PEC on Mag, GDMA2.  Prenatal labs nr/immune/-, GBS + on 10/24 (tx'ed with Vanc, PCN allergy).  SROM at 2108 on  with clear fluids. EOS score 0.32, highest maternal temperature 36.9. Baby emerged vigorous, crying. Cord clamping delayed 60 sec. Infant was brought to radiant warmer and warmed, dried, stimulated and suctioned. HR>100, normal respiratory effort. APGARS of 8/9. Mom is initiating breast  feeding. Consents to Hepatitis B vaccination. Declines for infant to be circumcised.     BW: 2830g  :   TOB: 2155    Physical Exam:  Gen: no acute distress, +grimace  HEENT:  anterior fontanel open soft and flat, nondysmorphic facies, no cleft lip/palate, ears normal set, no ear pits or tags, nares clinically patent  Resp: Normal respiratory effort without grunting or retractions, good air entry b/l, clear to auscultation bilaterally  Cardio: Present S1/S2, regular rate and rhythm, no murmurs  Abd: soft, non tender, non distended, umbilical cord with 3 vessels  Neuro: +palmar and plantar grasp, +suck, +сергей, normal tone  Extremities: negative river and ortolani maneuvers, moving all extremities, no clavicular crepitus or stepoff  Skin: pink, warm  Genitals: Normal male anatomy, testicles palpable in scrotum b/l, Armani 1, anus patent

## 2023-11-08 NOTE — DISCHARGE NOTE OB - CARE PLAN
Assessment and plan of treatment:	f/u in 2wks   Principal Discharge DX:	 delivery delivered  Assessment and plan of treatment:	-Please follow up with your OB within one week for a blood pressure check. Check blood pressures at home 2x a day. If your blood pressure is greater than 150/90, you develop a headache not relieved by Tylenol, visual disturbances, or right upper abdominal pain, call your doctor or the hospital, or go to your nearest emergency room.   -After discharge, please stay on pelvic rest for 6 weeks, meaning no sexual intercourse, no tampons and no douching.  No driving for 2 weeks as women can loose a lot of blood during delivery and there is a possibility of being lightheaded/fainting.  No lifting objects heavier than baby for two weeks.  Expect to have vaginal bleeding/spotting for up to six weeks.  The bleeding should get lighter and more white/light brown with time.  For bleeding soaking more than a pad an hour or passing clots greater than the size of your fist, come in to the emergency department.    Follow up in OB office in 1-2 weeks for incision check.  Call for noticeable increase in redness or swelling at incision, discharge from incision, or opening of skin at incision site  Secondary Diagnosis:	Preeclampsia   1 Principal Discharge DX:	 delivery delivered  Assessment and plan of treatment:	-  -After discharge, please stay on pelvic rest for 6 weeks, meaning no sexual intercourse, no tampons and no douching.  No driving for 2 weeks as women can loose a lot of blood during delivery and there is a possibility of being lightheaded/fainting.  No lifting objects heavier than baby for two weeks.  Expect to have vaginal bleeding/spotting for up to six weeks.  The bleeding should get lighter and more white/light brown with time.  For bleeding soaking more than a pad an hour or passing clots greater than the size of your fist, come in to the emergency department.    Follow up in OB office in 1-2 weeks for incision check.  Call for noticeable increase in redness or swelling at incision, discharge from incision, or opening of skin at incision site  Secondary Diagnosis:	Preeclampsia  Assessment and plan of treatment:	Please follow up with your OB within one week for a blood pressure check. Check blood pressures at home 2x a day. If your blood pressure is greater than 150/90, you develop a headache not relieved by Tylenol, visual disturbances, or right upper abdominal pain, call your doctor or the hospital, or go to your nearest emergency room.  Secondary Diagnosis:	Gestational diabetes mellitus (GDM) requiring insulin  Assessment and plan of treatment:	patient will have OGTT at post partum follow up   Principal Discharge DX:	 delivery delivered  Assessment and plan of treatment:	-After discharge, please stay on pelvic rest for 6 weeks, meaning no sexual intercourse, no tampons and no douching.  No driving for 2 weeks as women can loose a lot of blood during delivery and there is a possibility of being lightheaded/fainting.  No lifting objects heavier than baby for two weeks.  Expect to have vaginal bleeding/spotting for up to six weeks.  The bleeding should get lighter and more white/light brown with time.  For bleeding soaking more than a pad an hour or passing clots greater than the size of your fist, come in to the emergency department.    Follow up in OB office in 1-2 weeks for incision check.  Call for noticeable increase in redness or swelling at incision, discharge from incision, or opening of skin at incision site  Secondary Diagnosis:	Preeclampsia  Assessment and plan of treatment:	Please follow up with your OB within one week for a blood pressure check. Check blood pressures at home 2x a day. If your blood pressure is greater than 150/90, you develop a headache not relieved by Tylenol, visual disturbances, or right upper abdominal pain, call your doctor or the hospital, or go to your nearest emergency room.  Secondary Diagnosis:	Gestational diabetes mellitus (GDM) requiring insulin  Assessment and plan of treatment:	patient will have OGTT at post partum follow up

## 2023-11-08 NOTE — OB PROVIDER LABOR PROGRESS NOTE - NS_OBIHIFHRDETAILS_OBGYN_ALL_OB_FT
+moderate variability +accels   intermittent decels
120/min variability/no accels/no decels    cat 2
125bpm, mod, -accels, +recurrent variable decels, + prolonged decel as above
130 mod kamron/+accels/+intermittent variable decelerations
115 mod kamron/+accels/-decels
baseline 115 +moderate variability +accels to scalp stim -decels

## 2023-11-08 NOTE — CHART NOTE - NSCHARTNOTEFT_GEN_A_CORE
Attending Ntoe     PT seen and evaluated for fetal heart rate decelerations     AFVSS  Gen in NAD   Abd soft, gravid  Ext: no c/c/e    SVE 2/long/high   FHTS 125 min variability + decels   TOCO not tracing well     A/P: P0 at 39 weeks IOL for pre-x with severe features on mg   received epidural   bps lower and ask for ephedrine   reposition   when tracing resuscitated with start pitocin     R Christiana HAUSER eMERGENCY dEPARTMENT eNCOUnter   Independent Attestation     Pt Name: Guerrero Rashid  MRN: [id number]  [place] Gerhardt Burlington, MD  Attending Emergency Physician                    Joe Machuca MD  09/18/19 8500 RESULTS     EKG: All EKG's are interpreted by the Emergency Department Physician who either signs or Co-signs this chart in the absence of acardiologist.        RADIOLOGY:Allplain film, CT, MRI, and formal ultrasound images (except ED bedside ultrasound) are read by the radiologist and the images and interpretations are directly viewed by the emergency physician. LABS:All lab results were reviewed by myself, and all abnormals are listed below.   Labs Reviewed   STREP SCREEN GROUP A THROAT - Abnormal; Notable for the following components:       Result Value    Direct Exam POSITIVE for Group A Streptococci (*)     All other components within normal limits   CBC WITH AUTO DIFFERENTIAL - Abnormal; Notable for the following components:    Platelets 719 (*)     Seg Neutrophils 76 (*)     Lymphocytes 11 (*)     Monocytes 13 (*)     Absolute Lymph # 0.90 (*)     All other components within normal limits   COMPREHENSIVE METABOLIC PANEL W/ REFLEX TO MG FOR LOW K - Abnormal; Notable for the following components:    Glucose 105 (*)     ALT 63 (*)     AST 51 (*)     Total Protein 8.4 (*)     All other components within normal limits   URINALYSIS - Abnormal; Notable for the following components:    Color, UA DARK YELLOW (*)     Turbidity UA CLOUDY (*)     Specific Gravity, UA 1.031 (*)     Protein, UA 1+ (*)     All other components within normal limits   MICROSCOPIC URINALYSIS - Abnormal; Notable for the following components:    Bacteria, UA MANY (*)     Amorphous, UA 1+ (*)     All other components within normal limits   RAPID INFLUENZA A/B ANTIGENS   URINE CULTURE CLEAN CATCH   LACTIC ACID, PLASMA   MONONUCLEOSIS SCREEN         EMERGENCY DEPARTMENT COURSE:   Vitals:    Vitals:    09/18/19 1642 09/18/19 2013   BP: (!) 153/71 (!) 142/85   Pulse: 90 84   Resp: 14 15   Temp: 100.6 °F (38.1 °C) 98.8 °F (37.1 °C)   TempSrc: Oral Oral   SpO2: 97% 98%   Weight: 280 lb (127 kg)    Height: 6' (1.829 m)        The patient was

## 2023-11-08 NOTE — DISCHARGE NOTE OB - PATIENT PORTAL LINK FT
You can access the FollowMyHealth Patient Portal offered by [facility] by registering at the following website: [email or web address]. By joining [facility]’s FollowMyHealth portal, you will also be able to view your health information using other applications (apps) compatible with our system.

## 2023-11-08 NOTE — OB PROVIDER DELIVERY SUMMARY - NSPROVIDERDELIVERYNOTE_OBGYN_ALL_OB_FT
Liveborn male from ROT position apgars 8/9  nl appearing uterus/tubes/ovaries Liveborn male from ROT position apgars 8/9  2830G  nl appearing uterus/tubes/ovaries    EBL: 672   IVF: 600   UOP: 100     Dictation: Liveborn male from ROT position apgars 8/9  2830G  nl appearing uterus/tubes/ovaries    EBL: 672   IVF: 600   UOP: 100     Dictation: [id number]

## 2023-11-08 NOTE — OB RN DELIVERY SUMMARY - NSSELHIDDEN_OBGYN_ALL_OB_FT
[NS_DeliveryAttending1_OBGYN_ALL_OB_FT:[id number]],[NS_DeliveryAssist1_OBGYN_ALL_OB_FT:[id number]],[NS_DeliveryRN_OBGYN_ALL_OB_FT:[id number]]

## 2023-11-08 NOTE — OB RN DELIVERY SUMMARY - NSSKINTOSKINA_OBGYN_ALL_OB_START_DATE
Patient tolerated IV venofer without issue. Next appointment confirmed, AVS declined.
08-Nov-2023 23:00

## 2023-11-08 NOTE — DISCHARGE NOTE OB - HOSPITAL COURSE
induction of labor for sPEC  cytotec and pitocin induction  arrest at 7-8/90/-3    liveborn male for rot position

## 2023-11-08 NOTE — DISCHARGE NOTE OB - MEDICATION SUMMARY - MEDICATIONS TO STOP TAKING
I will STOP taking the medications listed below when I get home from the hospital:    Aspir 81 oral delayed release tablet  -- 2 tab(s) orally   I will STOP taking the medications listed below when I get home from the hospital:    Aspir 81 oral delayed release tablet  -- 2 tab(s) orally    NovoLOG 100 units/mL injectable solution  -- 50 unit(s) injectable once a day (at bedtime)

## 2023-11-08 NOTE — DISCHARGE NOTE OB - PLAN OF CARE
f/u in 2wks -Please follow up with your OB within one week for a blood pressure check. Check blood pressures at home 2x a day. If your blood pressure is greater than 150/90, you develop a headache not relieved by Tylenol, visual disturbances, or right upper abdominal pain, call your doctor or the hospital, or go to your nearest emergency room.   -After discharge, please stay on pelvic rest for 6 weeks, meaning no sexual intercourse, no tampons and no douching.  No driving for 2 weeks as women can loose a lot of blood during delivery and there is a possibility of being lightheaded/fainting.  No lifting objects heavier than baby for two weeks.  Expect to have vaginal bleeding/spotting for up to six weeks.  The bleeding should get lighter and more white/light brown with time.  For bleeding soaking more than a pad an hour or passing clots greater than the size of your fist, come in to the emergency department.    Follow up in OB office in 1-2 weeks for incision check.  Call for noticeable increase in redness or swelling at incision, discharge from incision, or opening of skin at incision site -  -After discharge, please stay on pelvic rest for 6 weeks, meaning no sexual intercourse, no tampons and no douching.  No driving for 2 weeks as women can loose a lot of blood during delivery and there is a possibility of being lightheaded/fainting.  No lifting objects heavier than baby for two weeks.  Expect to have vaginal bleeding/spotting for up to six weeks.  The bleeding should get lighter and more white/light brown with time.  For bleeding soaking more than a pad an hour or passing clots greater than the size of your fist, come in to the emergency department.    Follow up in OB office in 1-2 weeks for incision check.  Call for noticeable increase in redness or swelling at incision, discharge from incision, or opening of skin at incision site Please follow up with your OB within one week for a blood pressure check. Check blood pressures at home 2x a day. If your blood pressure is greater than 150/90, you develop a headache not relieved by Tylenol, visual disturbances, or right upper abdominal pain, call your doctor or the hospital, or go to your nearest emergency room. patient will have OGTT at post partum follow up -After discharge, please stay on pelvic rest for 6 weeks, meaning no sexual intercourse, no tampons and no douching.  No driving for 2 weeks as women can loose a lot of blood during delivery and there is a possibility of being lightheaded/fainting.  No lifting objects heavier than baby for two weeks.  Expect to have vaginal bleeding/spotting for up to six weeks.  The bleeding should get lighter and more white/light brown with time.  For bleeding soaking more than a pad an hour or passing clots greater than the size of your fist, come in to the emergency department.    Follow up in OB office in 1-2 weeks for incision check.  Call for noticeable increase in redness or swelling at incision, discharge from incision, or opening of skin at incision site

## 2023-11-08 NOTE — OB PROVIDER LABOR PROGRESS NOTE - ASSESSMENT
37 yo  at 39 weeks sPEC/mag (HELLP wnl p/c 0.2), A2 on insulin gtt   - approved for epidural top off  - cont with pitocin   - ISE not picking up FHR, removed. external toco in use   - cont to monitor     d/w Dr Stepan FOREMANC
@39.0wks sPEC/Mg  sp SROM @915p  Cervical change noted with exam  Cont with pitocin titration  Cont fetal/maternal monitoring  Cont magnesium/BP monitoring   Patient approved for epidural redose  Will reassess PRN      elmer Rogel NP
IUP 39 weeks   induction severe PEC  GDMA2  making cervical change    continue to titrate pitocin  insulin drip   magnesium  ISABEL Ying 
@ 38.6wks sPEC/Mg, A2  Cervical change noted  Confirmed SROM on exam- clear  Transition to pitocin when next agent is due  Cont fetal/maternal monitoring  Epidural PRN  Will reassess PRN    elmer Rogel NP
IUP 39w  GDM  PEC  intermittent category 2 tracing     Plan  continue insulin drip  continue mag  continue pitocin as tolerated  dw patient indications for CS    ISABEL Ying 
Patient with increased cervical effacement  IUPC in place  start amnioinfusion  continue maternal resuscitation PRN  pitocin paused until adequate tracing  continuous fetal monitoring    Dr. Villeda at bedside  NINO Zaragoza PGY2
 @39.0wks sPEC/Mg  VE unchanged from previous exam  Fetal head not engaged in pelvis  Pitocin discontinued at this time  MD Allison to speak with patient about potential primary c/s secondary to failure of fetal decent     ZACH Rogel NP

## 2023-11-08 NOTE — CHART NOTE - NSCHARTNOTEFT_GEN_A_CORE
Pt feeling intermittent pressure    ve 8/90/-3  fht 115, mod variability + scalp stim no decels  toco q 2-5 min    pt with no descent  now on pitocin since 1am    recommended delivery by cs for failure to descend  pt consented to cs  questions answered

## 2023-11-09 LAB
ALBUMIN SERPL ELPH-MCNC: 3 G/DL — LOW (ref 3.3–5)
ALP SERPL-CCNC: 155 U/L — HIGH (ref 40–120)
ALP SERPL-CCNC: 155 U/L — HIGH (ref 40–120)
ALP SERPL-CCNC: 162 U/L — HIGH (ref 40–120)
ALP SERPL-CCNC: 162 U/L — HIGH (ref 40–120)
ALT FLD-CCNC: 9 U/L — SIGNIFICANT CHANGE UP (ref 4–33)
ANION GAP SERPL CALC-SCNC: 14 MMOL/L — SIGNIFICANT CHANGE UP (ref 7–14)
APTT BLD: 29.3 SEC — SIGNIFICANT CHANGE UP (ref 24.5–35.6)
APTT BLD: 29.3 SEC — SIGNIFICANT CHANGE UP (ref 24.5–35.6)
APTT BLD: 29.6 SEC — SIGNIFICANT CHANGE UP (ref 24.5–35.6)
APTT BLD: 29.6 SEC — SIGNIFICANT CHANGE UP (ref 24.5–35.6)
AST SERPL-CCNC: 19 U/L — SIGNIFICANT CHANGE UP (ref 4–32)
AST SERPL-CCNC: 19 U/L — SIGNIFICANT CHANGE UP (ref 4–32)
AST SERPL-CCNC: 20 U/L — SIGNIFICANT CHANGE UP (ref 4–32)
AST SERPL-CCNC: 20 U/L — SIGNIFICANT CHANGE UP (ref 4–32)
BASOPHILS # BLD AUTO: 0.03 K/UL — SIGNIFICANT CHANGE UP (ref 0–0.2)
BASOPHILS # BLD AUTO: 0.03 K/UL — SIGNIFICANT CHANGE UP (ref 0–0.2)
BASOPHILS # BLD AUTO: 0.04 K/UL — SIGNIFICANT CHANGE UP (ref 0–0.2)
BASOPHILS # BLD AUTO: 0.04 K/UL — SIGNIFICANT CHANGE UP (ref 0–0.2)
BASOPHILS NFR BLD AUTO: 0.2 % — SIGNIFICANT CHANGE UP (ref 0–2)
BILIRUB SERPL-MCNC: 0.3 MG/DL — SIGNIFICANT CHANGE UP (ref 0.2–1.2)
BILIRUB SERPL-MCNC: 0.3 MG/DL — SIGNIFICANT CHANGE UP (ref 0.2–1.2)
BILIRUB SERPL-MCNC: 0.4 MG/DL — SIGNIFICANT CHANGE UP (ref 0.2–1.2)
BILIRUB SERPL-MCNC: 0.4 MG/DL — SIGNIFICANT CHANGE UP (ref 0.2–1.2)
BUN SERPL-MCNC: 10 MG/DL — SIGNIFICANT CHANGE UP (ref 7–23)
BUN SERPL-MCNC: 10 MG/DL — SIGNIFICANT CHANGE UP (ref 7–23)
BUN SERPL-MCNC: 9 MG/DL — SIGNIFICANT CHANGE UP (ref 7–23)
BUN SERPL-MCNC: 9 MG/DL — SIGNIFICANT CHANGE UP (ref 7–23)
CALCIUM SERPL-MCNC: 7.1 MG/DL — LOW (ref 8.4–10.5)
CALCIUM SERPL-MCNC: 7.1 MG/DL — LOW (ref 8.4–10.5)
CALCIUM SERPL-MCNC: 7.2 MG/DL — LOW (ref 8.4–10.5)
CALCIUM SERPL-MCNC: 7.2 MG/DL — LOW (ref 8.4–10.5)
CHLORIDE SERPL-SCNC: 94 MMOL/L — LOW (ref 98–107)
CHLORIDE SERPL-SCNC: 94 MMOL/L — LOW (ref 98–107)
CHLORIDE SERPL-SCNC: 95 MMOL/L — LOW (ref 98–107)
CHLORIDE SERPL-SCNC: 95 MMOL/L — LOW (ref 98–107)
CO2 SERPL-SCNC: 15 MMOL/L — LOW (ref 22–31)
CO2 SERPL-SCNC: 15 MMOL/L — LOW (ref 22–31)
CO2 SERPL-SCNC: 17 MMOL/L — LOW (ref 22–31)
CO2 SERPL-SCNC: 17 MMOL/L — LOW (ref 22–31)
CREAT SERPL-MCNC: 0.75 MG/DL — SIGNIFICANT CHANGE UP (ref 0.5–1.3)
CREAT SERPL-MCNC: 0.75 MG/DL — SIGNIFICANT CHANGE UP (ref 0.5–1.3)
CREAT SERPL-MCNC: 0.82 MG/DL — SIGNIFICANT CHANGE UP (ref 0.5–1.3)
CREAT SERPL-MCNC: 0.82 MG/DL — SIGNIFICANT CHANGE UP (ref 0.5–1.3)
EGFR: 104 ML/MIN/1.73M2 — SIGNIFICANT CHANGE UP
EGFR: 104 ML/MIN/1.73M2 — SIGNIFICANT CHANGE UP
EGFR: 93 ML/MIN/1.73M2 — SIGNIFICANT CHANGE UP
EGFR: 93 ML/MIN/1.73M2 — SIGNIFICANT CHANGE UP
EOSINOPHIL # BLD AUTO: 0.01 K/UL — SIGNIFICANT CHANGE UP (ref 0–0.5)
EOSINOPHIL # BLD AUTO: 0.01 K/UL — SIGNIFICANT CHANGE UP (ref 0–0.5)
EOSINOPHIL # BLD AUTO: 0.03 K/UL — SIGNIFICANT CHANGE UP (ref 0–0.5)
EOSINOPHIL # BLD AUTO: 0.03 K/UL — SIGNIFICANT CHANGE UP (ref 0–0.5)
EOSINOPHIL NFR BLD AUTO: 0.1 % — SIGNIFICANT CHANGE UP (ref 0–6)
EOSINOPHIL NFR BLD AUTO: 0.1 % — SIGNIFICANT CHANGE UP (ref 0–6)
EOSINOPHIL NFR BLD AUTO: 0.2 % — SIGNIFICANT CHANGE UP (ref 0–6)
EOSINOPHIL NFR BLD AUTO: 0.2 % — SIGNIFICANT CHANGE UP (ref 0–6)
FIBRINOGEN PPP-MCNC: 557 MG/DL — HIGH (ref 200–465)
FIBRINOGEN PPP-MCNC: 557 MG/DL — HIGH (ref 200–465)
FIBRINOGEN PPP-MCNC: 603 MG/DL — HIGH (ref 200–465)
FIBRINOGEN PPP-MCNC: 603 MG/DL — HIGH (ref 200–465)
GLUCOSE SERPL-MCNC: 126 MG/DL — HIGH (ref 70–99)
GLUCOSE SERPL-MCNC: 126 MG/DL — HIGH (ref 70–99)
GLUCOSE SERPL-MCNC: 143 MG/DL — HIGH (ref 70–99)
GLUCOSE SERPL-MCNC: 143 MG/DL — HIGH (ref 70–99)
HCT VFR BLD CALC: 29.3 % — LOW (ref 34.5–45)
HCT VFR BLD CALC: 29.3 % — LOW (ref 34.5–45)
HCT VFR BLD CALC: 30.7 % — LOW (ref 34.5–45)
HCT VFR BLD CALC: 30.7 % — LOW (ref 34.5–45)
HGB BLD-MCNC: 10.1 G/DL — LOW (ref 11.5–15.5)
HGB BLD-MCNC: 10.1 G/DL — LOW (ref 11.5–15.5)
HGB BLD-MCNC: 10.5 G/DL — LOW (ref 11.5–15.5)
HGB BLD-MCNC: 10.5 G/DL — LOW (ref 11.5–15.5)
IANC: 14.46 K/UL — HIGH (ref 1.8–7.4)
IANC: 14.46 K/UL — HIGH (ref 1.8–7.4)
IANC: 17.02 K/UL — HIGH (ref 1.8–7.4)
IANC: 17.02 K/UL — HIGH (ref 1.8–7.4)
IMM GRANULOCYTES NFR BLD AUTO: 0.4 % — SIGNIFICANT CHANGE UP (ref 0–0.9)
IMM GRANULOCYTES NFR BLD AUTO: 0.4 % — SIGNIFICANT CHANGE UP (ref 0–0.9)
IMM GRANULOCYTES NFR BLD AUTO: 0.6 % — SIGNIFICANT CHANGE UP (ref 0–0.9)
IMM GRANULOCYTES NFR BLD AUTO: 0.6 % — SIGNIFICANT CHANGE UP (ref 0–0.9)
INR BLD: 0.92 RATIO — SIGNIFICANT CHANGE UP (ref 0.85–1.18)
INR BLD: 0.92 RATIO — SIGNIFICANT CHANGE UP (ref 0.85–1.18)
INR BLD: 0.93 RATIO — SIGNIFICANT CHANGE UP (ref 0.85–1.18)
INR BLD: 0.93 RATIO — SIGNIFICANT CHANGE UP (ref 0.85–1.18)
LDH SERPL L TO P-CCNC: 222 U/L — SIGNIFICANT CHANGE UP (ref 135–225)
LDH SERPL L TO P-CCNC: 222 U/L — SIGNIFICANT CHANGE UP (ref 135–225)
LDH SERPL L TO P-CCNC: 237 U/L — HIGH (ref 135–225)
LDH SERPL L TO P-CCNC: 237 U/L — HIGH (ref 135–225)
LYMPHOCYTES # BLD AUTO: 1 K/UL — SIGNIFICANT CHANGE UP (ref 1–3.3)
LYMPHOCYTES # BLD AUTO: 1 K/UL — SIGNIFICANT CHANGE UP (ref 1–3.3)
LYMPHOCYTES # BLD AUTO: 1.11 K/UL — SIGNIFICANT CHANGE UP (ref 1–3.3)
LYMPHOCYTES # BLD AUTO: 1.11 K/UL — SIGNIFICANT CHANGE UP (ref 1–3.3)
LYMPHOCYTES # BLD AUTO: 5.8 % — LOW (ref 13–44)
LYMPHOCYTES # BLD AUTO: 5.8 % — LOW (ref 13–44)
LYMPHOCYTES # BLD AUTO: 6.2 % — LOW (ref 13–44)
LYMPHOCYTES # BLD AUTO: 6.2 % — LOW (ref 13–44)
MAGNESIUM SERPL-MCNC: 5.6 MG/DL — HIGH (ref 1.6–2.6)
MAGNESIUM SERPL-MCNC: 5.6 MG/DL — HIGH (ref 1.6–2.6)
MAGNESIUM SERPL-MCNC: 5.7 MG/DL — HIGH (ref 1.6–2.6)
MAGNESIUM SERPL-MCNC: 5.7 MG/DL — HIGH (ref 1.6–2.6)
MAGNESIUM SERPL-MCNC: 5.8 MG/DL — HIGH (ref 1.6–2.6)
MAGNESIUM SERPL-MCNC: 5.8 MG/DL — HIGH (ref 1.6–2.6)
MCHC RBC-ENTMCNC: 27.9 PG — SIGNIFICANT CHANGE UP (ref 27–34)
MCHC RBC-ENTMCNC: 34.2 GM/DL — SIGNIFICANT CHANGE UP (ref 32–36)
MCHC RBC-ENTMCNC: 34.2 GM/DL — SIGNIFICANT CHANGE UP (ref 32–36)
MCHC RBC-ENTMCNC: 34.5 GM/DL — SIGNIFICANT CHANGE UP (ref 32–36)
MCHC RBC-ENTMCNC: 34.5 GM/DL — SIGNIFICANT CHANGE UP (ref 32–36)
MCV RBC AUTO: 80.9 FL — SIGNIFICANT CHANGE UP (ref 80–100)
MCV RBC AUTO: 80.9 FL — SIGNIFICANT CHANGE UP (ref 80–100)
MCV RBC AUTO: 81.6 FL — SIGNIFICANT CHANGE UP (ref 80–100)
MCV RBC AUTO: 81.6 FL — SIGNIFICANT CHANGE UP (ref 80–100)
MONOCYTES # BLD AUTO: 0.67 K/UL — SIGNIFICANT CHANGE UP (ref 0–0.9)
MONOCYTES # BLD AUTO: 0.67 K/UL — SIGNIFICANT CHANGE UP (ref 0–0.9)
MONOCYTES # BLD AUTO: 0.68 K/UL — SIGNIFICANT CHANGE UP (ref 0–0.9)
MONOCYTES # BLD AUTO: 0.68 K/UL — SIGNIFICANT CHANGE UP (ref 0–0.9)
MONOCYTES NFR BLD AUTO: 3.6 % — SIGNIFICANT CHANGE UP (ref 2–14)
MONOCYTES NFR BLD AUTO: 3.6 % — SIGNIFICANT CHANGE UP (ref 2–14)
MONOCYTES NFR BLD AUTO: 4.1 % — SIGNIFICANT CHANGE UP (ref 2–14)
MONOCYTES NFR BLD AUTO: 4.1 % — SIGNIFICANT CHANGE UP (ref 2–14)
NEUTROPHILS # BLD AUTO: 14.46 K/UL — HIGH (ref 1.8–7.4)
NEUTROPHILS # BLD AUTO: 14.46 K/UL — HIGH (ref 1.8–7.4)
NEUTROPHILS # BLD AUTO: 17.02 K/UL — HIGH (ref 1.8–7.4)
NEUTROPHILS # BLD AUTO: 17.02 K/UL — HIGH (ref 1.8–7.4)
NEUTROPHILS NFR BLD AUTO: 89 % — HIGH (ref 43–77)
NEUTROPHILS NFR BLD AUTO: 89 % — HIGH (ref 43–77)
NEUTROPHILS NFR BLD AUTO: 89.6 % — HIGH (ref 43–77)
NEUTROPHILS NFR BLD AUTO: 89.6 % — HIGH (ref 43–77)
NRBC # BLD: 0 /100 WBCS — SIGNIFICANT CHANGE UP (ref 0–0)
NRBC # FLD: 0 K/UL — SIGNIFICANT CHANGE UP (ref 0–0)
PLATELET # BLD AUTO: 217 K/UL — SIGNIFICANT CHANGE UP (ref 150–400)
PLATELET # BLD AUTO: 217 K/UL — SIGNIFICANT CHANGE UP (ref 150–400)
PLATELET # BLD AUTO: 234 K/UL — SIGNIFICANT CHANGE UP (ref 150–400)
PLATELET # BLD AUTO: 234 K/UL — SIGNIFICANT CHANGE UP (ref 150–400)
POTASSIUM SERPL-MCNC: 3.9 MMOL/L — SIGNIFICANT CHANGE UP (ref 3.5–5.3)
POTASSIUM SERPL-MCNC: 3.9 MMOL/L — SIGNIFICANT CHANGE UP (ref 3.5–5.3)
POTASSIUM SERPL-MCNC: 4.1 MMOL/L — SIGNIFICANT CHANGE UP (ref 3.5–5.3)
POTASSIUM SERPL-MCNC: 4.1 MMOL/L — SIGNIFICANT CHANGE UP (ref 3.5–5.3)
POTASSIUM SERPL-SCNC: 3.9 MMOL/L — SIGNIFICANT CHANGE UP (ref 3.5–5.3)
POTASSIUM SERPL-SCNC: 3.9 MMOL/L — SIGNIFICANT CHANGE UP (ref 3.5–5.3)
POTASSIUM SERPL-SCNC: 4.1 MMOL/L — SIGNIFICANT CHANGE UP (ref 3.5–5.3)
POTASSIUM SERPL-SCNC: 4.1 MMOL/L — SIGNIFICANT CHANGE UP (ref 3.5–5.3)
PROT SERPL-MCNC: 5.8 G/DL — LOW (ref 6–8.3)
PROTHROM AB SERPL-ACNC: 10.4 SEC — SIGNIFICANT CHANGE UP (ref 9.5–13)
RBC # BLD: 3.62 M/UL — LOW (ref 3.8–5.2)
RBC # BLD: 3.62 M/UL — LOW (ref 3.8–5.2)
RBC # BLD: 3.76 M/UL — LOW (ref 3.8–5.2)
RBC # BLD: 3.76 M/UL — LOW (ref 3.8–5.2)
RBC # FLD: 15.1 % — HIGH (ref 10.3–14.5)
RBC # FLD: 15.1 % — HIGH (ref 10.3–14.5)
RBC # FLD: 15.2 % — HIGH (ref 10.3–14.5)
RBC # FLD: 15.2 % — HIGH (ref 10.3–14.5)
SODIUM SERPL-SCNC: 123 MMOL/L — LOW (ref 135–145)
SODIUM SERPL-SCNC: 123 MMOL/L — LOW (ref 135–145)
SODIUM SERPL-SCNC: 126 MMOL/L — LOW (ref 135–145)
SODIUM SERPL-SCNC: 126 MMOL/L — LOW (ref 135–145)
URATE SERPL-MCNC: 6 MG/DL — SIGNIFICANT CHANGE UP (ref 2.5–7)
URATE SERPL-MCNC: 6 MG/DL — SIGNIFICANT CHANGE UP (ref 2.5–7)
URATE SERPL-MCNC: 6.1 MG/DL — SIGNIFICANT CHANGE UP (ref 2.5–7)
URATE SERPL-MCNC: 6.1 MG/DL — SIGNIFICANT CHANGE UP (ref 2.5–7)
WBC # BLD: 16.23 K/UL — HIGH (ref 3.8–10.5)
WBC # BLD: 16.23 K/UL — HIGH (ref 3.8–10.5)
WBC # BLD: 19 K/UL — HIGH (ref 3.8–10.5)
WBC # BLD: 19 K/UL — HIGH (ref 3.8–10.5)
WBC # FLD AUTO: 16.23 K/UL — HIGH (ref 3.8–10.5)
WBC # FLD AUTO: 16.23 K/UL — HIGH (ref 3.8–10.5)
WBC # FLD AUTO: 19 K/UL — HIGH (ref 3.8–10.5)
WBC # FLD AUTO: 19 K/UL — HIGH (ref 3.8–10.5)

## 2023-11-09 RX ORDER — SODIUM CHLORIDE 9 MG/ML
1000 INJECTION INTRAMUSCULAR; INTRAVENOUS; SUBCUTANEOUS
Refills: 0 | Status: DISCONTINUED | OUTPATIENT
Start: 2023-11-09 | End: 2023-11-12

## 2023-11-09 RX ORDER — CITRIC ACID/SODIUM CITRATE 300-500 MG
15 SOLUTION, ORAL ORAL ONCE
Refills: 0 | Status: COMPLETED | OUTPATIENT
Start: 2023-11-09 | End: 2023-11-08

## 2023-11-09 RX ORDER — NIFEDIPINE 30 MG
30 TABLET, EXTENDED RELEASE 24 HR ORAL ONCE
Refills: 0 | Status: DISCONTINUED | OUTPATIENT
Start: 2023-11-09 | End: 2023-11-09

## 2023-11-09 RX ORDER — HEPARIN SODIUM 5000 [USP'U]/ML
10000 INJECTION INTRAVENOUS; SUBCUTANEOUS EVERY 12 HOURS
Refills: 0 | Status: DISCONTINUED | OUTPATIENT
Start: 2023-11-09 | End: 2023-11-12

## 2023-11-09 RX ORDER — OXYTOCIN 10 UNIT/ML
390 VIAL (ML) INJECTION
Qty: 20 | Refills: 0 | Status: DISCONTINUED | OUTPATIENT
Start: 2023-11-09 | End: 2023-11-12

## 2023-11-09 RX ORDER — IBUPROFEN 200 MG
600 TABLET ORAL EVERY 6 HOURS
Refills: 0 | Status: DISCONTINUED | OUTPATIENT
Start: 2023-11-09 | End: 2023-11-12

## 2023-11-09 RX ORDER — FAMOTIDINE 10 MG/ML
20 INJECTION INTRAVENOUS ONCE
Refills: 0 | Status: COMPLETED | OUTPATIENT
Start: 2023-11-09 | End: 2023-11-08

## 2023-11-09 RX ORDER — LEVOTHYROXINE SODIUM 125 MCG
175 TABLET ORAL DAILY
Refills: 0 | Status: DISCONTINUED | OUTPATIENT
Start: 2023-11-09 | End: 2023-11-12

## 2023-11-09 RX ORDER — OXYTOCIN 10 UNIT/ML
390 VIAL (ML) INJECTION
Qty: 20 | Refills: 0 | Status: DISCONTINUED | OUTPATIENT
Start: 2023-11-09 | End: 2023-11-09

## 2023-11-09 RX ORDER — NIFEDIPINE 30 MG
10 TABLET, EXTENDED RELEASE 24 HR ORAL ONCE
Refills: 0 | Status: DISCONTINUED | OUTPATIENT
Start: 2023-11-09 | End: 2023-11-09

## 2023-11-09 RX ADMIN — HEPARIN SODIUM 10000 UNIT(S): 5000 INJECTION INTRAVENOUS; SUBCUTANEOUS at 17:25

## 2023-11-09 RX ADMIN — HEPARIN SODIUM 10000 UNIT(S): 5000 INJECTION INTRAVENOUS; SUBCUTANEOUS at 06:06

## 2023-11-09 RX ADMIN — Medication 37.5 GM/HR: at 02:38

## 2023-11-09 RX ADMIN — Medication 30 MILLIGRAM(S): at 17:25

## 2023-11-09 RX ADMIN — Medication 975 MILLIGRAM(S): at 22:30

## 2023-11-09 RX ADMIN — Medication 37.5 GM/HR: at 06:58

## 2023-11-09 RX ADMIN — Medication 975 MILLIGRAM(S): at 21:50

## 2023-11-09 RX ADMIN — Medication 30 MILLIGRAM(S): at 05:26

## 2023-11-09 RX ADMIN — Medication 975 MILLIGRAM(S): at 09:00

## 2023-11-09 RX ADMIN — Medication 975 MILLIGRAM(S): at 08:17

## 2023-11-09 RX ADMIN — Medication 30 MILLIGRAM(S): at 05:45

## 2023-11-09 RX ADMIN — Medication 30 MILLIGRAM(S): at 18:15

## 2023-11-09 RX ADMIN — Medication 37.5 GM/HR: at 18:55

## 2023-11-09 RX ADMIN — Medication 30 MILLIGRAM(S): at 11:54

## 2023-11-09 RX ADMIN — Medication 175 MICROGRAM(S): at 05:53

## 2023-11-09 RX ADMIN — Medication 30 MILLIGRAM(S): at 12:43

## 2023-11-09 NOTE — PROGRESS NOTE ADULT - SUBJECTIVE AND OBJECTIVE BOX
OB Progress Note:  Delivery, POD#1    S: 39yo POD#1 s/p pLTCS 2/2 to Cat II tracing c/b sPEC on Mg. Her pain is well controlled. She is tolerating a regular diet, not yet passing flatus. Denies N/V. Denies CP/SOB/lightheadedness/dizziness.   Not yet OOB.    O:   Vital Signs Last 24 Hrs  T(C): 36.7 (2023 05:15), Max: 36.9 (2023 22:54)  T(F): 98.1 (2023 05:15), Max: 98.4 (2023 22:54)  HR: 67 (2023 05:15) (67 - 176)  BP: 107/61 (2023 05:15) (62/53 - 164/88)  BP(mean): 76 (2023 01:00) (55 - 87)  RR: 18 (2023 05:15) (15 - 23)  SpO2: 97% (2023 05:15) (76% - 100%)    Parameters below as of 2023 05:15  Patient On (Oxygen Delivery Method): room air        Labs:  Blood type: B Positive  Rubella IgG: RPR: Negative                          10.1<L>   16.23<H> >-----------< 217    (  @ 04:55 )             29.3<L>                        10.5<L>   19.00<H> >-----------< 234    (  @ 00:30 )             30.7<L>                        12.1   18.72<H> >-----------< 236    (  @ 18:40 )             35.6                        11.7   16.37<H> >-----------< 203    (  @ 09:00 )             34.5                        12.8   13.26<H> >-----------< 249    (  @ 21:00 )             38.3                        12.3   11.41<H> >-----------< 242    (  @ 12:54 )             36.4    23 @ 04:55      126<L>  |  95<L>  |  9   ----------------------------<  126<H>  4.1   |  17<L>  |  0.75    23 @ 00:36      123<L>  |  94<L>  |  10  ----------------------------<  143<H>  3.9   |  15<L>  |  0.82    23 @ 18:40      125<L>  |  93<L>  |  9   ----------------------------<  115<H>  4.2   |  17<L>  |  0.90    23 @ 09:00      130<L>  |  97<L>  |  9   ----------------------------<  122<H>  3.6   |  19<L>  |  0.64    23 @ 21:00      134<L>  |  100  |  9   ----------------------------<  75  4.7   |  19<L>  |  0.52    23 @ 12:54      135  |  104  |  8   ----------------------------<  78  3.9   |  18<L>  |  0.56        Ca    7.2<L>      2023 04:55  Ca    7.1<L>      2023 00:36  Ca    7.8<L>      2023 18:40  Ca    7.6<L>      2023 09:00  Ca    9.0      2023 21:00  Ca    9.2      2023 12:54  Mg     5.80<H>     11-09  Mg     6.70<H>     11-08  Mg     6.40<H>     11-08  Mg     6.00<H>     11-08  Mg     5.20<H>     11-08  Mg     4.60<H>         TPro  5.8<L>  /  Alb  3.0<L>  /  TBili  0.3  /  DBili  x   /  AST  20  /  ALT  9   /  AlkPhos  155<H>  23 @ 04:55  TPro  5.8<L>  /  Alb  3.0<L>  /  TBili  0.4  /  DBili  x   /  AST  19  /  ALT  9   /  AlkPhos  162<H>  23 @ 00:36  TPro  6.7  /  Alb  3.6  /  TBili  0.4  /  DBili  x   /  AST  20  /  ALT  10  /  AlkPhos  194<H>  23 @ 18:40  TPro  6.5  /  Alb  3.5  /  TBili  0.2  /  DBili  x   /  AST  18  /  ALT  10  /  AlkPhos  181<H>  23 @ 09:00  TPro  6.9  /  Alb  3.7  /  TBili  <0.2  /  DBili  x   /  AST  34<H>  /  ALT  16  /  AlkPhos  183<H>  23 @ 21:00  TPro  6.8  /  Alb  3.5  /  TBili  <0.2  /  DBili  x   /  AST  18  /  ALT  11  /  AlkPhos  183<H>  23 @ 12:54          PE:  General: NAD  Abdomen: Mildly distended, appropriately tender, incision c/d/i.  : Lochia WNL, smart catheter in place with clear yellow urine   Extremities: No erythema, no pitting edema

## 2023-11-09 NOTE — DIETITIAN INITIAL EVALUATION ADULT - NUTRITIONGOAL OUTCOME1
Ensure glycemic control in postpartum period / encourage diet modification to aid in minimizing overall risk of DM

## 2023-11-09 NOTE — DIETITIAN INITIAL EVALUATION ADULT - OTHER INFO
RD visited with patient for requested diabetes education from endocrinology team due to h/o GDM.    11/9/23 - 39yo POD#1 s/p pLTCS 2/2 to Cat II tracing c/b sPEC on Mg.     Patient on regular diet post delivery.  Good appetite / PO intake reported.  No GI distress reported or noted i.e. nausea, vomiting, diarrhea. RD provided patient with diabetes education materials to minimize future risk of DMII. Patient reported that she previously followed with MFM team for diabetes education for GDM and has a good understanding of diabetic diet principles. No questions or concerns verbalized at time of visit. RD visited with patient for requested diabetes education from endocrinology team due to h/o GDM.    11/9/23 - 39yo POD#1 s/p pLTCS 2/2 to Cat II tracing c/b sPEC on Mg.     Patient on regular diet post delivery.  Good appetite / PO intake reported.  No GI distress reported or noted i.e. nausea, vomiting, diarrhea. RD provided patient with diabetes education materials to minimize future risk of DMII. Patient reported that she previously followed with MFM team for diabetes education for GDM and has a good understanding of diabetic diet principles. No questions or concerns verbalized at time of visit.    Noted allergy to codfish and lobster.

## 2023-11-09 NOTE — DIETITIAN INITIAL EVALUATION ADULT - PERTINENT LABORATORY DATA
11-09    126<L>  |  95<L>  |  9   ----------------------------<  126<H>  4.1   |  17<L>  |  0.75    Ca    7.2<L>      09 Nov 2023 04:55  Mg     5.80     11-09    TPro  5.8<L>  /  Alb  3.0<L>  /  TBili  0.3  /  DBili  x   /  AST  20  /  ALT  9   /  AlkPhos  155<H>  11-09    CAPILLARY BLOOD GLUCOSE  POCT Blood Glucose.: 118 mg/dL (08 Nov 2023 21:08)  POCT Blood Glucose.: 138 mg/dL (08 Nov 2023 19:44)  POCT Blood Glucose.: 116 mg/dL (08 Nov 2023 18:41)  POCT Blood Glucose.: 102 mg/dL (08 Nov 2023 17:49)  POCT Blood Glucose.: 98 mg/dL (08 Nov 2023 16:45)  POCT Blood Glucose.: 90 mg/dL (08 Nov 2023 15:44)  POCT Blood Glucose.: 85 mg/dL (08 Nov 2023 14:43)  POCT Blood Glucose.: 85 mg/dL (08 Nov 2023 13:45)  POCT Blood Glucose.: 93 mg/dL (08 Nov 2023 12:27)  POCT Blood Glucose.: 130 mg/dL (08 Nov 2023 11:27)  POCT Blood Glucose.: 141 mg/dL (08 Nov 2023 10:33)

## 2023-11-09 NOTE — PROGRESS NOTE ADULT - ASSESSMENT
A/P: 39yo POD#1 s/p pLTCS 2/2 to Cat II tracing c/b sPEC on Mg .  Patient is stable and doing well post-operatively.      #sPEC  - Met by BPs and headache  - Denies severe features today  - Continue Mg@1.5 till 11/9 @21:55, in the setting of elevated Cr  - BPs overnight: 164/88@20:54, since 100s-110s/60s-70s  - No hypertensive medications   - Continue to monitor BPs  - Cr: 0.9->0.82->0.75  - Mg level: 6.7-> 5.8    #Hypothyroid   - Continue home Synthroid 175mcg    #Postpartum  - EBL: 627  - Hct: 35.6->30.7->29.3  - Continue regular diet.  - Continue motrin, tylenol, oxycodone PRN for pain control.    - F/u AM CBC    Nieves Oliveira, PGY-1 A/P: 37yo POD#1 s/p pLTCS 2/2 to Cat II tracing c/b sPEC on Mg .  Patient is stable and doing well post-operatively.      #sPEC  - Met by BPs and headache  - Denies severe features today  - Continue Mg@1.5 till 11/9 @21:55, in the setting of elevated Cr  - BPs overnight: 164/88@20:54, since 100s-110s/60s-70s  - No hypertensive medications   - Continue to monitor BPs  - Cr: 0.9->0.82->0.75  - Mg level: 6.7-> 5.8  - Na- 134->130-> 125->123->126  - Repeat BMP tomorrow AM    #Hypothyroid   - Continue home Synthroid 175mcg    #Postpartum  - EBL: 627  - Hct: 35.6->30.7->29.3  - Continue regular diet.  - Continue motrin, tylenol, oxycodone PRN for pain control.    - F/u AM CBC    Nieves Oliveira, PGY-1 A/P: 39yo POD#1 s/p pLTCS 2/2 to Cat II tracing c/b sPEC on Mg .  Patient is stable and doing well post-operatively.      #sPEC  - Met by BPs and headache  - Denies severe features today  - Continue Mg@1.5 till 11/9 @21:55, in the setting of elevated Cr  - BPs overnight: 164/88@20:54, since 100s-110s/60s-70s  - No hypertensive medications   - Continue to monitor BPs  - Cr: 0.9->0.82->0.75  - Mg level: 6.7-> 5.8  - Na- 134->130-> 125->123->126  - Repeat BMP tomorrow AM    #Hypothyroid   - Continue home Synthroid 175mcg    #Postpartum  - EBL: 627  - Hct: 35.6->30.7->29.3  - Continue regular diet.  - Continue motrin, tylenol, oxycodone PRN for pain control.    - F/u AM CBC    Nieves Oliveira, PGY-1    Attending note   patient seen and examined on 11/09/23   agree with assessment and management plan C Gigi

## 2023-11-09 NOTE — DIETITIAN INITIAL EVALUATION ADULT - ADD RECOMMEND
Patient would likely benefit from outpatient RD/CDE visit for ongoing nutrition education, individualized nutritional strategies to ensure glycemic control,  and to help ensure long-term adherence to nutritional recommendations.

## 2023-11-09 NOTE — DIETITIAN INITIAL EVALUATION ADULT - PERTINENT MEDS FT
MEDICATIONS  (STANDING):  acetaminophen     Tablet .. 975 milliGRAM(s) Oral <User Schedule>  clindamycin IVPB 900 milliGRAM(s) IV Intermittent once  diphtheria/tetanus/pertussis (acellular) Vaccine (Adacel) 0.5 milliLiter(s) IntraMuscular once  heparin   Injectable 61320 Unit(s) SubCutaneous every 12 hours  ibuprofen  Tablet. 600 milliGRAM(s) Oral every 6 hours  influenza   Vaccine 0.5 milliLiter(s) IntraMuscular once  ketorolac   Injectable 30 milliGRAM(s) IV Push every 6 hours  levothyroxine 175 MICROGram(s) Oral daily  magnesium sulfate Infusion 1.5 Gm/Hr (37.5 mL/Hr) IV Continuous <Continuous>  oxytocin Infusion 333.333 milliUNIT(s)/Min (1000 mL/Hr) IV Continuous <Continuous>  oxytocin Infusion 390 milliUNIT(s)/Min (1170 mL/Hr) IV Continuous <Continuous>  sodium chloride 0.9%. 1000 milliLiter(s) (62.5 mL/Hr) IV Continuous <Continuous>    MEDICATIONS  (PRN):  dexAMETHasone  Injectable 4 milliGRAM(s) IV Push every 6 hours PRN Nausea  diphenhydrAMINE 25 milliGRAM(s) Oral every 6 hours PRN Pruritus  lanolin Ointment 1 Application(s) Topical every 6 hours PRN Sore Nipples  magnesium hydroxide Suspension 30 milliLiter(s) Oral two times a day PRN Constipation  nalbuphine Injectable 2.5 milliGRAM(s) IV Push every 6 hours PRN Pruritus  naloxone Injectable 0.1 milliGRAM(s) IV Push every 3 minutes PRN For ANY of the following changes in patient status:  A. Breaths Per Minute LESS THAN 10, B. Oxygen saturation LESS THAN 90%, C. Sedation score of 6 for Stop After: 4 Times  ondansetron Injectable 4 milliGRAM(s) IV Push every 6 hours PRN Nausea  oxyCODONE    IR 10 milliGRAM(s) Oral every 3 hours PRN Moderate Pain (4 - 6)  oxyCODONE    IR 5 milliGRAM(s) Oral every 3 hours PRN Moderate to Severe Pain (4-10)  oxyCODONE    IR 5 milliGRAM(s) Oral every 3 hours PRN Mild Pain (1 - 3)  oxyCODONE    IR 5 milliGRAM(s) Oral once PRN Moderate to Severe Pain (4-10)  simethicone 80 milliGRAM(s) Chew every 4 hours PRN Gas

## 2023-11-10 ENCOUNTER — APPOINTMENT (OUTPATIENT)
Dept: ANTEPARTUM | Facility: CLINIC | Age: 38
End: 2023-11-10

## 2023-11-10 LAB
ANION GAP SERPL CALC-SCNC: 12 MMOL/L — SIGNIFICANT CHANGE UP (ref 7–14)
ANION GAP SERPL CALC-SCNC: 12 MMOL/L — SIGNIFICANT CHANGE UP (ref 7–14)
BASOPHILS # BLD AUTO: 0.02 K/UL — SIGNIFICANT CHANGE UP (ref 0–0.2)
BASOPHILS # BLD AUTO: 0.02 K/UL — SIGNIFICANT CHANGE UP (ref 0–0.2)
BASOPHILS NFR BLD AUTO: 0.2 % — SIGNIFICANT CHANGE UP (ref 0–2)
BASOPHILS NFR BLD AUTO: 0.2 % — SIGNIFICANT CHANGE UP (ref 0–2)
BUN SERPL-MCNC: 13 MG/DL — SIGNIFICANT CHANGE UP (ref 7–23)
BUN SERPL-MCNC: 13 MG/DL — SIGNIFICANT CHANGE UP (ref 7–23)
CALCIUM SERPL-MCNC: 7.7 MG/DL — LOW (ref 8.4–10.5)
CALCIUM SERPL-MCNC: 7.7 MG/DL — LOW (ref 8.4–10.5)
CHLORIDE SERPL-SCNC: 104 MMOL/L — SIGNIFICANT CHANGE UP (ref 98–107)
CHLORIDE SERPL-SCNC: 104 MMOL/L — SIGNIFICANT CHANGE UP (ref 98–107)
CO2 SERPL-SCNC: 18 MMOL/L — LOW (ref 22–31)
CO2 SERPL-SCNC: 18 MMOL/L — LOW (ref 22–31)
CREAT SERPL-MCNC: 0.73 MG/DL — SIGNIFICANT CHANGE UP (ref 0.5–1.3)
CREAT SERPL-MCNC: 0.73 MG/DL — SIGNIFICANT CHANGE UP (ref 0.5–1.3)
EGFR: 107 ML/MIN/1.73M2 — SIGNIFICANT CHANGE UP
EGFR: 107 ML/MIN/1.73M2 — SIGNIFICANT CHANGE UP
EOSINOPHIL # BLD AUTO: 0.23 K/UL — SIGNIFICANT CHANGE UP (ref 0–0.5)
EOSINOPHIL # BLD AUTO: 0.23 K/UL — SIGNIFICANT CHANGE UP (ref 0–0.5)
EOSINOPHIL NFR BLD AUTO: 2.6 % — SIGNIFICANT CHANGE UP (ref 0–6)
EOSINOPHIL NFR BLD AUTO: 2.6 % — SIGNIFICANT CHANGE UP (ref 0–6)
GLUCOSE SERPL-MCNC: 88 MG/DL — SIGNIFICANT CHANGE UP (ref 70–99)
GLUCOSE SERPL-MCNC: 88 MG/DL — SIGNIFICANT CHANGE UP (ref 70–99)
HCT VFR BLD CALC: 26.8 % — LOW (ref 34.5–45)
HCT VFR BLD CALC: 26.8 % — LOW (ref 34.5–45)
HGB BLD-MCNC: 9 G/DL — LOW (ref 11.5–15.5)
HGB BLD-MCNC: 9 G/DL — LOW (ref 11.5–15.5)
IANC: 6.03 K/UL — SIGNIFICANT CHANGE UP (ref 1.8–7.4)
IANC: 6.03 K/UL — SIGNIFICANT CHANGE UP (ref 1.8–7.4)
IMM GRANULOCYTES NFR BLD AUTO: 0.3 % — SIGNIFICANT CHANGE UP (ref 0–0.9)
IMM GRANULOCYTES NFR BLD AUTO: 0.3 % — SIGNIFICANT CHANGE UP (ref 0–0.9)
LYMPHOCYTES # BLD AUTO: 1.76 K/UL — SIGNIFICANT CHANGE UP (ref 1–3.3)
LYMPHOCYTES # BLD AUTO: 1.76 K/UL — SIGNIFICANT CHANGE UP (ref 1–3.3)
LYMPHOCYTES # BLD AUTO: 20 % — SIGNIFICANT CHANGE UP (ref 13–44)
LYMPHOCYTES # BLD AUTO: 20 % — SIGNIFICANT CHANGE UP (ref 13–44)
MAGNESIUM SERPL-MCNC: 3.4 MG/DL — HIGH (ref 1.6–2.6)
MAGNESIUM SERPL-MCNC: 3.4 MG/DL — HIGH (ref 1.6–2.6)
MCHC RBC-ENTMCNC: 28 PG — SIGNIFICANT CHANGE UP (ref 27–34)
MCHC RBC-ENTMCNC: 28 PG — SIGNIFICANT CHANGE UP (ref 27–34)
MCHC RBC-ENTMCNC: 33.6 GM/DL — SIGNIFICANT CHANGE UP (ref 32–36)
MCHC RBC-ENTMCNC: 33.6 GM/DL — SIGNIFICANT CHANGE UP (ref 32–36)
MCV RBC AUTO: 83.2 FL — SIGNIFICANT CHANGE UP (ref 80–100)
MCV RBC AUTO: 83.2 FL — SIGNIFICANT CHANGE UP (ref 80–100)
MONOCYTES # BLD AUTO: 0.72 K/UL — SIGNIFICANT CHANGE UP (ref 0–0.9)
MONOCYTES # BLD AUTO: 0.72 K/UL — SIGNIFICANT CHANGE UP (ref 0–0.9)
MONOCYTES NFR BLD AUTO: 8.2 % — SIGNIFICANT CHANGE UP (ref 2–14)
MONOCYTES NFR BLD AUTO: 8.2 % — SIGNIFICANT CHANGE UP (ref 2–14)
NEUTROPHILS # BLD AUTO: 6.03 K/UL — SIGNIFICANT CHANGE UP (ref 1.8–7.4)
NEUTROPHILS # BLD AUTO: 6.03 K/UL — SIGNIFICANT CHANGE UP (ref 1.8–7.4)
NEUTROPHILS NFR BLD AUTO: 68.7 % — SIGNIFICANT CHANGE UP (ref 43–77)
NEUTROPHILS NFR BLD AUTO: 68.7 % — SIGNIFICANT CHANGE UP (ref 43–77)
NRBC # BLD: 0 /100 WBCS — SIGNIFICANT CHANGE UP (ref 0–0)
NRBC # BLD: 0 /100 WBCS — SIGNIFICANT CHANGE UP (ref 0–0)
NRBC # FLD: 0 K/UL — SIGNIFICANT CHANGE UP (ref 0–0)
NRBC # FLD: 0 K/UL — SIGNIFICANT CHANGE UP (ref 0–0)
PHOSPHATE SERPL-MCNC: 4.3 MG/DL — SIGNIFICANT CHANGE UP (ref 2.5–4.5)
PHOSPHATE SERPL-MCNC: 4.3 MG/DL — SIGNIFICANT CHANGE UP (ref 2.5–4.5)
PLATELET # BLD AUTO: 176 K/UL — SIGNIFICANT CHANGE UP (ref 150–400)
PLATELET # BLD AUTO: 176 K/UL — SIGNIFICANT CHANGE UP (ref 150–400)
POTASSIUM SERPL-MCNC: 4.2 MMOL/L — SIGNIFICANT CHANGE UP (ref 3.5–5.3)
POTASSIUM SERPL-MCNC: 4.2 MMOL/L — SIGNIFICANT CHANGE UP (ref 3.5–5.3)
POTASSIUM SERPL-SCNC: 4.2 MMOL/L — SIGNIFICANT CHANGE UP (ref 3.5–5.3)
POTASSIUM SERPL-SCNC: 4.2 MMOL/L — SIGNIFICANT CHANGE UP (ref 3.5–5.3)
RBC # BLD: 3.22 M/UL — LOW (ref 3.8–5.2)
RBC # BLD: 3.22 M/UL — LOW (ref 3.8–5.2)
RBC # FLD: 15.5 % — HIGH (ref 10.3–14.5)
RBC # FLD: 15.5 % — HIGH (ref 10.3–14.5)
SODIUM SERPL-SCNC: 134 MMOL/L — LOW (ref 135–145)
SODIUM SERPL-SCNC: 134 MMOL/L — LOW (ref 135–145)
WBC # BLD: 8.79 K/UL — SIGNIFICANT CHANGE UP (ref 3.8–10.5)
WBC # BLD: 8.79 K/UL — SIGNIFICANT CHANGE UP (ref 3.8–10.5)
WBC # FLD AUTO: 8.79 K/UL — SIGNIFICANT CHANGE UP (ref 3.8–10.5)
WBC # FLD AUTO: 8.79 K/UL — SIGNIFICANT CHANGE UP (ref 3.8–10.5)

## 2023-11-10 RX ORDER — TETANUS TOXOID, REDUCED DIPHTHERIA TOXOID AND ACELLULAR PERTUSSIS VACCINE, ADSORBED 5; 2.5; 8; 8; 2.5 [IU]/.5ML; [IU]/.5ML; UG/.5ML; UG/.5ML; UG/.5ML
0.5 SUSPENSION INTRAMUSCULAR ONCE
Refills: 0 | Status: COMPLETED | OUTPATIENT
Start: 2023-11-10 | End: 2023-11-11

## 2023-11-10 RX ADMIN — Medication 600 MILLIGRAM(S): at 18:56

## 2023-11-10 RX ADMIN — Medication 975 MILLIGRAM(S): at 08:46

## 2023-11-10 RX ADMIN — Medication 600 MILLIGRAM(S): at 00:30

## 2023-11-10 RX ADMIN — Medication 600 MILLIGRAM(S): at 06:11

## 2023-11-10 RX ADMIN — Medication 975 MILLIGRAM(S): at 16:04

## 2023-11-10 RX ADMIN — Medication 600 MILLIGRAM(S): at 06:44

## 2023-11-10 RX ADMIN — Medication 975 MILLIGRAM(S): at 21:40

## 2023-11-10 RX ADMIN — Medication 600 MILLIGRAM(S): at 18:26

## 2023-11-10 RX ADMIN — Medication 175 MICROGRAM(S): at 06:11

## 2023-11-10 RX ADMIN — HEPARIN SODIUM 10000 UNIT(S): 5000 INJECTION INTRAVENOUS; SUBCUTANEOUS at 06:10

## 2023-11-10 RX ADMIN — HEPARIN SODIUM 10000 UNIT(S): 5000 INJECTION INTRAVENOUS; SUBCUTANEOUS at 18:26

## 2023-11-10 RX ADMIN — INFLUENZA VIRUS VACCINE 0.5 MILLILITER(S): 15; 15; 15; 15 SUSPENSION INTRAMUSCULAR at 06:16

## 2023-11-10 RX ADMIN — Medication 600 MILLIGRAM(S): at 12:13

## 2023-11-10 RX ADMIN — Medication 600 MILLIGRAM(S): at 00:06

## 2023-11-10 RX ADMIN — Medication 975 MILLIGRAM(S): at 08:16

## 2023-11-10 RX ADMIN — Medication 975 MILLIGRAM(S): at 16:34

## 2023-11-10 RX ADMIN — Medication 975 MILLIGRAM(S): at 21:10

## 2023-11-10 RX ADMIN — Medication 600 MILLIGRAM(S): at 11:43

## 2023-11-10 NOTE — PROGRESS NOTE ADULT - NUTRITIONAL ASSESSMENT
This patient has been assessed with a concern for Malnutrition and has been determined to have a diagnosis/diagnoses of Morbid obesity (BMI > 40).    This patient is being managed with:   Diet Regular-  Entered: Nov 9 2023  1:31AM

## 2023-11-10 NOTE — PROGRESS NOTE ADULT - SUBJECTIVE AND OBJECTIVE BOX
OB Progress Note: LTCS, POD#2    S: 37yo POD#2 s/p pLTCS 2/2 Cat II tracing c/b sPEC s/p Mg. Pain is well controlled. She is tolerating a regular diet and passing flatus. She is voiding spontaneously, and ambulating without difficulty. Denies CP/SOB. Denies lightheadedness/dizziness. Denies N/V. Denies vision changes, RUQ pain, LE edema.    O:  Vitals:  Vital Signs Last 24 Hrs  T(C): 36.6 (10 Nov 2023 05:31), Max: 36.8 (09 Nov 2023 07:45)  T(F): 97.9 (10 Nov 2023 05:31), Max: 98.2 (09 Nov 2023 07:45)  HR: 68 (10 Nov 2023 05:31) (61 - 68)  BP: 109/60 (10 Nov 2023 05:31) (98/56 - 120/60)  BP(mean): --  RR: 18 (10 Nov 2023 05:31) (17 - 18)  SpO2: 100% (10 Nov 2023 05:31) (98% - 100%)    Parameters below as of 09 Nov 2023 22:36  Patient On (Oxygen Delivery Method): room air        MEDICATIONS  (STANDING):  acetaminophen     Tablet .. 975 milliGRAM(s) Oral <User Schedule>  clindamycin IVPB 900 milliGRAM(s) IV Intermittent once  diphtheria/tetanus/pertussis (acellular) Vaccine (Adacel) 0.5 milliLiter(s) IntraMuscular once  heparin   Injectable 43178 Unit(s) SubCutaneous every 12 hours  ibuprofen  Tablet. 600 milliGRAM(s) Oral every 6 hours  levothyroxine 175 MICROGram(s) Oral daily  magnesium sulfate Infusion 1.5 Gm/Hr (37.5 mL/Hr) IV Continuous <Continuous>  oxytocin Infusion 390 milliUNIT(s)/Min (1170 mL/Hr) IV Continuous <Continuous>  sodium chloride 0.9%. 1000 milliLiter(s) (62.5 mL/Hr) IV Continuous <Continuous>      MEDICATIONS  (PRN):  dexAMETHasone  Injectable 4 milliGRAM(s) IV Push every 6 hours PRN Nausea  diphenhydrAMINE 25 milliGRAM(s) Oral every 6 hours PRN Pruritus  lanolin Ointment 1 Application(s) Topical every 6 hours PRN Sore Nipples  magnesium hydroxide Suspension 30 milliLiter(s) Oral two times a day PRN Constipation  nalbuphine Injectable 2.5 milliGRAM(s) IV Push every 6 hours PRN Pruritus  naloxone Injectable 0.1 milliGRAM(s) IV Push every 3 minutes PRN For ANY of the following changes in patient status:  A. Breaths Per Minute LESS THAN 10, B. Oxygen saturation LESS THAN 90%, C. Sedation score of 6 for Stop After: 4 Times  ondansetron Injectable 4 milliGRAM(s) IV Push every 6 hours PRN Nausea  oxyCODONE    IR 5 milliGRAM(s) Oral once PRN Moderate to Severe Pain (4-10)  oxyCODONE    IR 10 milliGRAM(s) Oral every 3 hours PRN Moderate Pain (4 - 6)  oxyCODONE    IR 5 milliGRAM(s) Oral every 3 hours PRN Moderate to Severe Pain (4-10)  oxyCODONE    IR 5 milliGRAM(s) Oral every 3 hours PRN Mild Pain (1 - 3)  simethicone 80 milliGRAM(s) Chew every 4 hours PRN Gas      Labs:  Blood type: B Positive  Rubella IgG: RPR: Negative                          9.0<L>   8.79 >-----------< 176    ( 11-10 @ 05:44 )             26.8<L>                        10.1<L>   16.23<H> >-----------< 217    ( 11-09 @ 04:55 )             29.3<L>                        10.5<L>   19.00<H> >-----------< 234    ( 11-09 @ 00:30 )             30.7<L>                        12.1   18.72<H> >-----------< 236    ( 11-08 @ 18:40 )             35.6                        11.7   16.37<H> >-----------< 203    ( 11-08 @ 09:00 )             34.5                        12.8   13.26<H> >-----------< 249    ( 11-07 @ 21:00 )             38.3                        12.3   11.41<H> >-----------< 242    ( 11-07 @ 12:54 )             36.4    11-10-23 @ 05:44      134<L>  |  104  |  13  ----------------------------<  88  4.2   |  18<L>  |  0.73    11-09-23 @ 04:55      126<L>  |  95<L>  |  9   ----------------------------<  126<H>  4.1   |  17<L>  |  0.75    11-09-23 @ 00:36      123<L>  |  94<L>  |  10  ----------------------------<  143<H>  3.9   |  15<L>  |  0.82    11-08-23 @ 18:40      125<L>  |  93<L>  |  9   ----------------------------<  115<H>  4.2   |  17<L>  |  0.90    11-08-23 @ 09:00      130<L>  |  97<L>  |  9   ----------------------------<  122<H>  3.6   |  19<L>  |  0.64    11-07-23 @ 21:00      134<L>  |  100  |  9   ----------------------------<  75  4.7   |  19<L>  |  0.52    11-07-23 @ 12:54      135  |  104  |  8   ----------------------------<  78  3.9   |  18<L>  |  0.56        Ca    7.7<L>      10 Nov 2023 05:44  Ca    7.2<L>      09 Nov 2023 04:55  Ca    7.1<L>      09 Nov 2023 00:36  Ca    7.8<L>      08 Nov 2023 18:40  Ca    7.6<L>      08 Nov 2023 09:00  Ca    9.0      07 Nov 2023 21:00  Ca    9.2      07 Nov 2023 12:54  Phos  4.3     11-10  Mg     3.40<H>     11-10  Mg     5.60<H>     11-09  Mg     5.70<H>     11-09  Mg     5.80<H>     11-09  Mg     6.70<H>     11-08  Mg     6.40<H>     11-08  Mg     6.00<H>     11-08  Mg     5.20<H>     11-08  Mg     4.60<H>     11-07    TPro  5.8<L>  /  Alb  3.0<L>  /  TBili  0.3  /  DBili  x   /  AST  20  /  ALT  9   /  AlkPhos  155<H>  11-09-23 @ 04:55  TPro  5.8<L>  /  Alb  3.0<L>  /  TBili  0.4  /  DBili  x   /  AST  19  /  ALT  9   /  AlkPhos  162<H>  11-09-23 @ 00:36  TPro  6.7  /  Alb  3.6  /  TBili  0.4  /  DBili  x   /  AST  20  /  ALT  10  /  AlkPhos  194<H>  11-08-23 @ 18:40  TPro  6.5  /  Alb  3.5  /  TBili  0.2  /  DBili  x   /  AST  18  /  ALT  10  /  AlkPhos  181<H>  11-08-23 @ 09:00  TPro  6.9  /  Alb  3.7  /  TBili  <0.2  /  DBili  x   /  AST  34<H>  /  ALT  16  /  AlkPhos  183<H>  11-07-23 @ 21:00  TPro  6.8  /  Alb  3.5  /  TBili  <0.2  /  DBili  x   /  AST  18  /  ALT  11  /  AlkPhos  183<H>  11-07-23 @ 12:54          PE:  General: NAD  Abdomen: Soft, appropriately tender, uterus firm, incision c/d/i.  : lochia WNL  Extremities: No erythema, no pitting edema

## 2023-11-10 NOTE — PROGRESS NOTE ADULT - ASSESSMENT
A/P: 39yo POD#2 s/p pLTCS 2/2 Cat II tracing c/b sPEC s/p Mg.  Patient is stable and doing well post-operatively.        #sPEC  - Met by BPs and headache  - Denies severe features today  - S/p Mg  - BPs overnight: 100s-120s/50s-70s  - No hypertensive medications   - Continue to monitor BPs  - Cr: 0.9->0.82->0.75->0.73    #Hyponatremia  - Patient asymptomatic  - Resolving after discontinuation of Mg infusion     - Na- 134->130-> 125->123->126-> 135    #Hypothyroid   - Continue home Synthroid 175mcg    #Postpartum  - EBL: 627  - Hct: 35.6->30.7->29.3->26.8  - Continue regular diet.  - Continue motrin, tylenol, oxycodone PRN for pain control.        Nieves Oliveira, PGY-1

## 2023-11-10 NOTE — PROGRESS NOTE ADULT - ATTENDING COMMENTS
Attending Note   Agree with above  pt reports feeling well   bay under bililights  abd soft, fundus firm nontender  incision c/d/i   routine postop care  monitor bps         R Christiana HAUSER

## 2023-11-11 RX ADMIN — Medication 975 MILLIGRAM(S): at 03:20

## 2023-11-11 RX ADMIN — Medication 600 MILLIGRAM(S): at 03:48

## 2023-11-11 RX ADMIN — Medication 600 MILLIGRAM(S): at 23:48

## 2023-11-11 RX ADMIN — Medication 975 MILLIGRAM(S): at 14:21

## 2023-11-11 RX ADMIN — Medication 600 MILLIGRAM(S): at 11:59

## 2023-11-11 RX ADMIN — Medication 600 MILLIGRAM(S): at 00:15

## 2023-11-11 RX ADMIN — Medication 175 MICROGRAM(S): at 06:17

## 2023-11-11 RX ADMIN — HEPARIN SODIUM 10000 UNIT(S): 5000 INJECTION INTRAVENOUS; SUBCUTANEOUS at 18:23

## 2023-11-11 RX ADMIN — Medication 975 MILLIGRAM(S): at 02:48

## 2023-11-11 RX ADMIN — Medication 600 MILLIGRAM(S): at 18:23

## 2023-11-11 RX ADMIN — Medication 975 MILLIGRAM(S): at 09:27

## 2023-11-11 RX ADMIN — Medication 600 MILLIGRAM(S): at 00:45

## 2023-11-11 RX ADMIN — HEPARIN SODIUM 10000 UNIT(S): 5000 INJECTION INTRAVENOUS; SUBCUTANEOUS at 06:21

## 2023-11-11 RX ADMIN — Medication 600 MILLIGRAM(S): at 06:17

## 2023-11-11 RX ADMIN — Medication 600 MILLIGRAM(S): at 12:29

## 2023-11-11 RX ADMIN — Medication 975 MILLIGRAM(S): at 14:51

## 2023-11-11 RX ADMIN — Medication 975 MILLIGRAM(S): at 21:16

## 2023-11-11 RX ADMIN — Medication 975 MILLIGRAM(S): at 08:57

## 2023-11-11 RX ADMIN — Medication 600 MILLIGRAM(S): at 18:53

## 2023-11-11 RX ADMIN — Medication 975 MILLIGRAM(S): at 20:44

## 2023-11-11 RX ADMIN — TETANUS TOXOID, REDUCED DIPHTHERIA TOXOID AND ACELLULAR PERTUSSIS VACCINE, ADSORBED 0.5 MILLILITER(S): 5; 2.5; 8; 8; 2.5 SUSPENSION INTRAMUSCULAR at 06:17

## 2023-11-11 NOTE — PROGRESS NOTE ADULT - SUBJECTIVE AND OBJECTIVE BOX
OB Postpartum Note:  Delivery, POD#3    S: 37yo POD#3 s/p LTCS c/b sPEC. The patient feels well.  Pain is well controlled. She is tolerating a regular diet and passing flatus. She is voiding spontaneously, and ambulating without difficulty. Denies CP/SOB. Denies lightheadedness/dizziness. Denies N/V.    O:  Vitals:  Vital Signs Last 24 Hrs  T(C): 36.8 (2023 06:52), Max: 37.2 (10 Nov 2023 09:22)  T(F): 98.2 (2023 06:52), Max: 99 (10 Nov 2023 09:22)  HR: 84 (2023 06:52) (71 - 89)  BP: 116/77 (2023 06:52) (112/61 - 131/82)  BP(mean): --  RR: 18 (2023 06:52) (18 - 19)  SpO2: 99% (2023 06:52) (98% - 100%)    Parameters below as of 2023 06:52  Patient On (Oxygen Delivery Method): room air        MEDICATIONS  (STANDING):  acetaminophen     Tablet .. 975 milliGRAM(s) Oral <User Schedule>  clindamycin IVPB 900 milliGRAM(s) IV Intermittent once  heparin   Injectable 64528 Unit(s) SubCutaneous every 12 hours  ibuprofen  Tablet. 600 milliGRAM(s) Oral every 6 hours  levothyroxine 175 MICROGram(s) Oral daily  magnesium sulfate Infusion 1.5 Gm/Hr (37.5 mL/Hr) IV Continuous <Continuous>  oxytocin Infusion 390 milliUNIT(s)/Min (1170 mL/Hr) IV Continuous <Continuous>  sodium chloride 0.9%. 1000 milliLiter(s) (62.5 mL/Hr) IV Continuous <Continuous>    MEDICATIONS  (PRN):  dexAMETHasone  Injectable 4 milliGRAM(s) IV Push every 6 hours PRN Nausea  diphenhydrAMINE 25 milliGRAM(s) Oral every 6 hours PRN Pruritus  lanolin Ointment 1 Application(s) Topical every 6 hours PRN Sore Nipples  magnesium hydroxide Suspension 30 milliLiter(s) Oral two times a day PRN Constipation  nalbuphine Injectable 2.5 milliGRAM(s) IV Push every 6 hours PRN Pruritus  naloxone Injectable 0.1 milliGRAM(s) IV Push every 3 minutes PRN For ANY of the following changes in patient status:  A. Breaths Per Minute LESS THAN 10, B. Oxygen saturation LESS THAN 90%, C. Sedation score of 6 for Stop After: 4 Times  ondansetron Injectable 4 milliGRAM(s) IV Push every 6 hours PRN Nausea  oxyCODONE    IR 5 milliGRAM(s) Oral once PRN Moderate to Severe Pain (4-10)  oxyCODONE    IR 5 milliGRAM(s) Oral every 3 hours PRN Moderate to Severe Pain (4-10)  simethicone 80 milliGRAM(s) Chew every 4 hours PRN Gas      LABS:  Blood type: B Positive  Rubella IgG: RPR: Negative                          9.0<L>   8.79 >-----------< 176    ( 11-10 @ 05:44 )             26.8<L>                        10.1<L>   16.23<H> >-----------< 217    (  04:55 )             29.3<L>                        10.5<L>   19.00<H> >-----------< 234    (  00:30 )             30.7<L>                        12.1   18.72<H> >-----------< 236    (  @ 18:40 )             35.6                        11.7   16.37<H> >-----------< 203    (  09:00 )             34.5    11-10-23 @ 05:44      134<L>  |  104  |  13  ----------------------------<  88  4.2   |  18<L>  |  0.73    23 04:55      126<L>  |  95<L>  |  9   ----------------------------<  126<H>  4.1   |  17<L>  |  0.75    23 @ 00:36      123<L>  |  94<L>  |  10  ----------------------------<  143<H>  3.9   |  15<L>  |  0.82    23 @ 18:40      125<L>  |  93<L>  |  9   ----------------------------<  115<H>  4.2   |  17<L>  |  0.90    23 @ 09:00      130<L>  |  97<L>  |  9   ----------------------------<  122<H>  3.6   |  19<L>  |  0.64        Ca    7.7<L>      10 Nov 2023 05:44  Ca    7.2<L>      2023 04:55  Ca    7.1<L>      2023 00:36  Ca    7.8<L>      2023 18:40  Ca    7.6<L>      2023 09:00  Phos  4.3     11-10  Mg     3.40<H>     11-10  Mg     5.60<H>     11-09  Mg     5.70<H>     11-09  Mg     5.80<H>     11-09  Mg     6.70<H>     11-08  Mg     6.40<H>     11-08  Mg     6.00<H>         TPro  5.8<L>  /  Alb  3.0<L>  /  TBili  0.3  /  DBili  x   /  AST  20  /  ALT  9   /  AlkPhos  155<H>  23 @ 04:55  TPro  5.8<L>  /  Alb  3.0<L>  /  TBili  0.4  /  DBili  x   /  AST  19  /  ALT  9   /  AlkPhos  162<H>  23 @ 00:36  TPro  6.7  /  Alb  3.6  /  TBili  0.4  /  DBili  x   /  AST  20  /  ALT  10  /  AlkPhos  194<H>  23 @ 18:40  TPro  6.5  /  Alb  3.5  /  TBili  0.2  /  DBili  x   /  AST  18  /  ALT  10  /  AlkPhos  181<H>  11-08-23 @ 09:00          Physical exam:  Gen: NAD  Abdomen: Soft, nontender, no distension , firm uterine fundus at umbilicus.  Incision: Clean, dry, and intact   Pelvic: Normal lochia noted  Ext: No calf tenderness

## 2023-11-11 NOTE — PROGRESS NOTE ADULT - ASSESSMENT
A/P: 39yo POD#3 s/p LTCS c/b sPEC.  Patient is stable and is doing well post-operatively.    #Postop from LTCS  - Continue motrin, tylenol, oxycodone PRN for pain control.  - Increase ambulation  - Continue regular diet  - Discharge planning    #sPEC  -cw BP monitoring, BP: 116/77 (11-11-23 @ 06:52) (116/77 - 131/82)  -HELLP wnl, P/C: 0.2  -Pt not on medication  -No PEC symptoms    Nan Sterling MD PGY1

## 2023-11-11 NOTE — PROGRESS NOTE ADULT - NUTRITIONAL ASSESSMENT
This patient has been assessed with a concern for Malnutrition and has been determined to have a diagnosis/diagnoses of Morbid obesity (BMI > 40).    This patient is being managed with:   Diet Regular-  Entered: Nov 9 2023  1:31AM   This patient has been assessed with a concern for Malnutrition and has been determined to have a diagnosis/diagnoses of Morbid obesity (BMI > 40).    This patient is being managed with:   Diet Regular-  Entered: Nov 9 2023  1:31AM    Attending note   agree with assessment and plan   patient for discharge planning in am C Gigi

## 2023-11-12 VITALS
SYSTOLIC BLOOD PRESSURE: 135 MMHG | TEMPERATURE: 98 F | RESPIRATION RATE: 18 BRPM | HEART RATE: 81 BPM | OXYGEN SATURATION: 100 % | DIASTOLIC BLOOD PRESSURE: 70 MMHG

## 2023-11-12 LAB
MAGNESIUM SERPL-MCNC: 1.6 MG/DL — SIGNIFICANT CHANGE UP (ref 1.6–2.6)
MAGNESIUM SERPL-MCNC: 1.6 MG/DL — SIGNIFICANT CHANGE UP (ref 1.6–2.6)

## 2023-11-12 RX ORDER — LORATADINE 10 MG/1
10 TABLET ORAL DAILY
Refills: 0 | Status: DISCONTINUED | OUTPATIENT
Start: 2023-11-12 | End: 2023-11-12

## 2023-11-12 RX ORDER — ACETAMINOPHEN 500 MG
3 TABLET ORAL
Qty: 0 | Refills: 0 | DISCHARGE
Start: 2023-11-12

## 2023-11-12 RX ORDER — IBUPROFEN 200 MG
1 TABLET ORAL
Qty: 0 | Refills: 0 | DISCHARGE
Start: 2023-11-12

## 2023-11-12 RX ADMIN — SIMETHICONE 80 MILLIGRAM(S): 80 TABLET, CHEWABLE ORAL at 06:13

## 2023-11-12 RX ADMIN — Medication 975 MILLIGRAM(S): at 08:23

## 2023-11-12 RX ADMIN — Medication 175 MICROGRAM(S): at 06:13

## 2023-11-12 RX ADMIN — Medication 600 MILLIGRAM(S): at 06:45

## 2023-11-12 RX ADMIN — Medication 600 MILLIGRAM(S): at 06:13

## 2023-11-12 RX ADMIN — Medication 600 MILLIGRAM(S): at 12:59

## 2023-11-12 RX ADMIN — Medication 975 MILLIGRAM(S): at 09:00

## 2023-11-12 RX ADMIN — HEPARIN SODIUM 10000 UNIT(S): 5000 INJECTION INTRAVENOUS; SUBCUTANEOUS at 06:13

## 2023-11-12 RX ADMIN — Medication 600 MILLIGRAM(S): at 00:36

## 2023-11-12 NOTE — PROGRESS NOTE ADULT - SUBJECTIVE AND OBJECTIVE BOX
OB Postpartum Note:  Delivery, POD#4    S: 37yo POD#4 s/p LTCS. The patient feels well.  Pain is well controlled. She is tolerating a regular diet and passing flatus. She is voiding spontaneously, and ambulating without difficulty. Denies CP/SOB. Denies lightheadedness/dizziness. Denies N/V. Feeling ready for discharge. No vision changes, RUQ pain, LE edema.     O:  Vitals:  Vital Signs Last 24 Hrs  T(C): 36.8 (2023 06:42), Max: 36.9 (2023 10:00)  T(F): 98.2 (2023 06:42), Max: 98.5 (2023 10:00)  HR: 67 (2023 06:42) (67 - 99)  BP: 133/64 (2023 06:42) (91/49 - 136/60)  BP(mean): --  RR: 18 (2023 06:42) (17 - 18)  SpO2: 100% (2023 06:42) (99% - 100%)    Parameters below as of 2023 06:42  Patient On (Oxygen Delivery Method): room air        MEDICATIONS  (STANDING):  acetaminophen     Tablet .. 975 milliGRAM(s) Oral <User Schedule>  clindamycin IVPB 900 milliGRAM(s) IV Intermittent once  heparin   Injectable 58336 Unit(s) SubCutaneous every 12 hours  ibuprofen  Tablet. 600 milliGRAM(s) Oral every 6 hours  levothyroxine 175 MICROGram(s) Oral daily  loratadine 10 milliGRAM(s) Oral daily  magnesium sulfate Infusion 1.5 Gm/Hr (37.5 mL/Hr) IV Continuous <Continuous>  oxytocin Infusion 390 milliUNIT(s)/Min (1170 mL/Hr) IV Continuous <Continuous>  sodium chloride 0.9%. 1000 milliLiter(s) (62.5 mL/Hr) IV Continuous <Continuous>    MEDICATIONS  (PRN):  dexAMETHasone  Injectable 4 milliGRAM(s) IV Push every 6 hours PRN Nausea  diphenhydrAMINE 25 milliGRAM(s) Oral every 6 hours PRN Pruritus  lanolin Ointment 1 Application(s) Topical every 6 hours PRN Sore Nipples  magnesium hydroxide Suspension 30 milliLiter(s) Oral two times a day PRN Constipation  nalbuphine Injectable 2.5 milliGRAM(s) IV Push every 6 hours PRN Pruritus  naloxone Injectable 0.1 milliGRAM(s) IV Push every 3 minutes PRN For ANY of the following changes in patient status:  A. Breaths Per Minute LESS THAN 10, B. Oxygen saturation LESS THAN 90%, C. Sedation score of 6 for Stop After: 4 Times  ondansetron Injectable 4 milliGRAM(s) IV Push every 6 hours PRN Nausea  oxyCODONE    IR 5 milliGRAM(s) Oral once PRN Moderate to Severe Pain (4-10)  oxyCODONE    IR 5 milliGRAM(s) Oral every 3 hours PRN Moderate to Severe Pain (4-10)  simethicone 80 milliGRAM(s) Chew every 4 hours PRN Gas      LABS:  Blood type: B Positive  Rubella IgG: RPR: Negative                          9.0<L>   8.79 >-----------< 176    ( 11-10 @ 05:44 )             26.8<L>    11-10- @ 05:44      134<L>  |  104  |  13  ----------------------------<  88  4.2   |  18<L>  |  0.73        Ca    7.7<L>      10 Nov 2023 05:44  Phos  4.3     11-10  Mg     1.60     11-12  Mg     3.40<H>     11-10  Mg     5.60<H>     11-09  Mg     5.70<H>     11-09            Physical exam:  Gen: NAD  Abdomen: Soft, nontender, no distension , firm uterine fundus at umbilicus.  Incision: Clean, dry, and intact   Pelvic: Normal lochia noted  Ext: No calf tenderness

## 2023-11-12 NOTE — PROGRESS NOTE ADULT - ASSESSMENT
A/P: 37yo POD#4 s/p LTCS c/b sPEC.  Patient is stable and is doing well post-operatively.    #Postop from LTCS  - Continue motrin, tylenol, oxycodone PRN for pain control.  - Increase ambulation  - Continue regular diet  - Discharge planning    #sPEC  -BP wnl overnight   -s/p Mg  -HELLP wnl, P/C: 0.2  -Pt not on medication  -No PEC symptoms    Nieves Oliveira, PGY-1

## 2023-11-13 ENCOUNTER — NON-APPOINTMENT (OUTPATIENT)
Age: 38
End: 2023-11-13

## 2023-11-13 DIAGNOSIS — E03.9 HYPOTHYROIDISM, UNSPECIFIED: ICD-10-CM

## 2023-11-13 RX ORDER — HUMAN INSULIN 100 [IU]/ML
100 INJECTION, SUSPENSION SUBCUTANEOUS
Qty: 1 | Refills: 0 | Status: DISCONTINUED | COMMUNITY
Start: 2023-04-21 | End: 2023-11-13

## 2023-11-13 RX ORDER — MISOPROSTOL 200 UG/1
200 TABLET ORAL
Qty: 2 | Refills: 0 | Status: DISCONTINUED | COMMUNITY
Start: 2022-12-27 | End: 2023-11-13

## 2023-11-13 RX ORDER — LEVOTHYROXINE SODIUM 0.17 MG/1
175 TABLET ORAL DAILY
Refills: 0 | Status: ACTIVE | COMMUNITY

## 2023-11-13 RX ORDER — PEN NEEDLE, DIABETIC 32GX 5/32"
32G X 4 MM NEEDLE, DISPOSABLE MISCELLANEOUS
Qty: 2 | Refills: 1 | Status: DISCONTINUED | COMMUNITY
Start: 2023-04-21 | End: 2023-11-13

## 2023-11-13 RX ORDER — ACETAMINOPHEN 325 MG/1
325 TABLET ORAL
Refills: 0 | Status: ACTIVE | COMMUNITY
Start: 2023-11-13

## 2023-11-13 RX ORDER — AZITHROMYCIN 500 MG/1
500 TABLET, FILM COATED ORAL
Qty: 2 | Refills: 0 | Status: DISCONTINUED | COMMUNITY
Start: 2022-12-27 | End: 2023-11-13

## 2023-11-13 RX ORDER — ISOPROPYL ALCOHOL 0.7 ML/ML
SWAB TOPICAL
Qty: 1 | Refills: 0 | Status: DISCONTINUED | COMMUNITY
Start: 2023-04-21 | End: 2023-11-13

## 2023-11-13 RX ORDER — INSULIN ASPART 100 [IU]/ML
100 INJECTION, SOLUTION INTRAVENOUS; SUBCUTANEOUS
Qty: 1 | Refills: 0 | Status: DISCONTINUED | COMMUNITY
Start: 2023-04-28 | End: 2023-11-13

## 2023-11-13 RX ORDER — URINE ACETONE TEST STRIPS
STRIP MISCELLANEOUS
Qty: 1 | Refills: 2 | Status: DISCONTINUED | COMMUNITY
Start: 2023-04-21 | End: 2023-11-13

## 2023-11-13 RX ORDER — OXYCODONE AND ACETAMINOPHEN 5; 325 MG/1; MG/1
5-325 TABLET ORAL
Qty: 5 | Refills: 0 | Status: DISCONTINUED | COMMUNITY
Start: 2022-12-27 | End: 2023-11-13

## 2023-11-13 RX ORDER — ASPIRIN 81 MG
81 TABLET, DELAYED RELEASE (ENTERIC COATED) ORAL
Refills: 0 | Status: DISCONTINUED | COMMUNITY
End: 2023-11-13

## 2023-11-15 ENCOUNTER — NON-APPOINTMENT (OUTPATIENT)
Age: 38
End: 2023-11-15

## 2023-11-20 ENCOUNTER — NON-APPOINTMENT (OUTPATIENT)
Age: 38
End: 2023-11-20

## 2023-11-21 ENCOUNTER — TRANSCRIPTION ENCOUNTER (OUTPATIENT)
Age: 38
End: 2023-11-21

## 2023-11-27 ENCOUNTER — NON-APPOINTMENT (OUTPATIENT)
Age: 38
End: 2023-11-27

## 2023-12-05 ENCOUNTER — APPOINTMENT (OUTPATIENT)
Dept: CARDIOLOGY | Facility: CLINIC | Age: 38
End: 2023-12-05

## 2024-01-02 LAB
SURGICAL PATHOLOGY STUDY: SIGNIFICANT CHANGE UP
SURGICAL PATHOLOGY STUDY: SIGNIFICANT CHANGE UP

## 2024-01-25 ENCOUNTER — TRANSCRIPTION ENCOUNTER (OUTPATIENT)
Age: 39
End: 2024-01-25

## 2024-01-31 NOTE — ASU PATIENT PROFILE, ADULT - TRANSFUSION REACTION, PREVIOUS, PROFILE
Billing Type: Third-Party Bill Performing Laboratory: [id number] Expected Date Of Service: 01/31/2024 Lab Facility: 0 Bill For Surgical Tray: no no

## 2024-03-16 RX ORDER — ASPIRIN/CALCIUM CARB/MAGNESIUM 324 MG
2 TABLET ORAL
Refills: 0 | DISCHARGE

## 2024-03-16 RX ORDER — FERROUS SULFATE 325(65) MG
1 TABLET ORAL
Qty: 0 | Refills: 0 | DISCHARGE

## 2024-03-16 RX ORDER — ASPIRIN/CALCIUM CARB/MAGNESIUM 324 MG
1 TABLET ORAL
Qty: 0 | Refills: 0 | DISCHARGE

## 2024-03-16 RX ORDER — CETIRIZINE HYDROCHLORIDE 10 MG/1
1 TABLET ORAL
Qty: 0 | Refills: 0 | DISCHARGE

## 2024-03-16 RX ORDER — INSULIN ASPART 100 [IU]/ML
50 INJECTION, SOLUTION SUBCUTANEOUS
Refills: 0 | DISCHARGE

## 2024-03-16 RX ORDER — LEVOTHYROXINE SODIUM 125 MCG
1 TABLET ORAL
Qty: 0 | Refills: 0 | DISCHARGE

## 2024-05-28 PROBLEM — O35.8XX0 MATERNAL CARE FOR OTHER (SUSPECTED) FETAL ABNORMALITY AND DAMAGE, NOT APPLICABLE OR UNSPECIFIED: Chronic | Status: ACTIVE | Noted: 2022-12-27

## 2024-05-28 PROBLEM — Z52.9 DONOR OF UNSPECIFIED ORGAN OR TISSUE: Chronic | Status: ACTIVE | Noted: 2022-12-27

## 2024-05-28 PROBLEM — E66.9 OBESITY, UNSPECIFIED: Chronic | Status: ACTIVE | Noted: 2022-12-27

## 2024-05-28 PROBLEM — E03.9 HYPOTHYROIDISM, UNSPECIFIED: Chronic | Status: ACTIVE | Noted: 2022-12-27

## 2024-06-26 ENCOUNTER — APPOINTMENT (OUTPATIENT)
Dept: MAMMOGRAPHY | Facility: CLINIC | Age: 39
End: 2024-06-26
Payer: COMMERCIAL

## 2024-06-26 ENCOUNTER — APPOINTMENT (OUTPATIENT)
Dept: ULTRASOUND IMAGING | Facility: CLINIC | Age: 39
End: 2024-06-26
Payer: COMMERCIAL

## 2024-06-26 PROCEDURE — 77067 SCR MAMMO BI INCL CAD: CPT

## 2024-06-26 PROCEDURE — 76641 ULTRASOUND BREAST COMPLETE: CPT | Mod: 50

## 2024-06-26 PROCEDURE — 77063 BREAST TOMOSYNTHESIS BI: CPT

## 2024-07-03 ENCOUNTER — APPOINTMENT (OUTPATIENT)
Dept: ULTRASOUND IMAGING | Facility: IMAGING CENTER | Age: 39
End: 2024-07-03
Payer: COMMERCIAL

## 2024-07-03 ENCOUNTER — OUTPATIENT (OUTPATIENT)
Dept: OUTPATIENT SERVICES | Facility: HOSPITAL | Age: 39
LOS: 1 days | End: 2024-07-03
Payer: COMMERCIAL

## 2024-07-03 DIAGNOSIS — Z98.890 OTHER SPECIFIED POSTPROCEDURAL STATES: Chronic | ICD-10-CM

## 2024-07-03 DIAGNOSIS — Z52.6 LIVER DONOR: Chronic | ICD-10-CM

## 2024-07-03 DIAGNOSIS — Z90.49 ACQUIRED ABSENCE OF OTHER SPECIFIED PARTS OF DIGESTIVE TRACT: Chronic | ICD-10-CM

## 2024-07-03 DIAGNOSIS — R92.8 OTHER ABNORMAL AND INCONCLUSIVE FINDINGS ON DIAGNOSTIC IMAGING OF BREAST: ICD-10-CM

## 2024-07-03 PROCEDURE — 76642 ULTRASOUND BREAST LIMITED: CPT | Mod: 26,RT

## 2024-07-03 PROCEDURE — 76642 ULTRASOUND BREAST LIMITED: CPT

## 2024-12-26 NOTE — ED ADULT NURSE NOTE - HIV OFFER
----- Message from Alyssa Ibarra MD sent at 12/26/2024 11:43 AM CST -----  Needs lipid panel in 3 months. Please schedule with pt    Opt out

## 2025-01-08 NOTE — OB RN INTRAOPERATIVE NOTE - NS_SCRUBTECH_OBGYN_ALL_OB_FT
Patient scheduled.    Recent Visits  Date Type Provider Dept   11/18/24 Office Visit [facility]ador [facility]   08/19/24 Office Visit Avis Amador MD [facility]   05/03/24 Office Visit Avis Amador MD [facility]   02/05/24 Office Visit Avis Amador MD [facility]   10/30/23 Office Visit Avis Amador MD [facility]   07/28/23 Office Visit Avis Amador MD [facility]   Showing recent visits within past 540 days with a meds authorizing provider and meeting all other requirements  Today's Visits  Date Type Provider Dept   01/08/25 Appointment Leesa Thompson MD [facility]   Showing today's visits with a meds authorizing provider and meeting all other requirements  Future Appointments  Date Type Provider Dept   02/20/25 Appointment Avis Amador MD [facility]   Showing future appointments within next 150 days with a meds authorizing provider and meeting all other requirements    
Pt will be calling back as he wanted a vv and I explained that we are unable to do  vv unless the pt has tested positive for covid  
Sha Kohler CST

## 2025-04-08 NOTE — OB RN TRIAGE NOTE - PRETERM DELIVERIES, OB PROFILE
Anesthesia Evaluation     Patient summary reviewed and Nursing notes reviewed   no history of anesthetic complications:   NPO Solid Status: > 8 hours  NPO Liquid Status: > 2 hours           Airway   Mallampati: II  TM distance: >3 FB  Possible difficult intubation  Dental - normal exam     Pulmonary - normal exam    breath sounds clear to auscultation  (+) a smoker (Quit 1981) Former,  Cardiovascular - normal exam    Patient on routine beta blocker  Rhythm: regular  Rate: normal    (+) hypertension, hyperlipidemia    ROS comment: ECHO 05/2024:  ·  Left ventricular systolic function is normal. Calculated left ventricular EF of 51%  ·  Left ventricular diastolic function was normal.  ·  Normal right ventricular systolic function  ·  No significant valvular abnormality  ·  No pericardial effusion  ·  No prior echocardiogram for comparison      Neuro/Psych  (+) psychiatric history Anxiety  GI/Hepatic/Renal/Endo    (+) GERD, renal disease- stones    ROS Comment: Diverticular disease s/p resection and later ostomy takedown    Musculoskeletal     Abdominal    Substance History - negative use     OB/GYN          Other - negative ROS       ROS/Med Hx Other: Airway note 06/2024: DL MIL 2, 7.0 ETT, G1V                Anesthesia Plan    ASA 3     general with block     intravenous induction     Anesthetic plan, risks, benefits, and alternatives have been provided, discussed and informed consent has been obtained with: patient.    Plan discussed with CRNA.    CODE STATUS:         
How Severe Are Your Spot(S)?: mild
What Is The Reason For Today's Visit?: Full Body Skin Examination
What Is The Reason For Today's Visit? (Being Monitored For X): concerning skin lesions on an annual basis
1

## (undated) DEVICE — VACUUM CURETTE BERKLEY OLYMPUS CURVED 9MM

## (undated) DEVICE — PROTECTOR HEEL / ELBOW FLUFFY

## (undated) DEVICE — SOL IRR POUR H2O 500ML

## (undated) DEVICE — TISSUE TRAP BERKELEY SAFE TOUCH

## (undated) DEVICE — LABELS BLANK W PEN

## (undated) DEVICE — TUBING GYRUS ACMI COLLECTION SET 6FT

## (undated) DEVICE — GLV 7 PROTEXIS (CREAM) MICRO

## (undated) DEVICE — VISITEC 4X4

## (undated) DEVICE — DRSG TELFA 3 X 8

## (undated) DEVICE — BASIN SET DOUBLE

## (undated) DEVICE — DRSG PAD SANITARY OB

## (undated) DEVICE — PACK PERI GYN

## (undated) DEVICE — POSITIONER STRAP ARMBOARD VELCRO TS-30

## (undated) DEVICE — GOWN LG

## (undated) DEVICE — VACUUM CURETTE BUSSE HOSP CURVED 7MM

## (undated) DEVICE — DRAPE TOWEL BLUE 17" X 24"

## (undated) DEVICE — WARMING BLANKET FULL ADULT

## (undated) DEVICE — BOTTLE COLLECTION KIT CAP 3SM 2 LG

## (undated) DEVICE — GOWN XL

## (undated) DEVICE — VENODYNE/SCD SLEEVE CALF MEDIUM

## (undated) DEVICE — DRAPE LIGHT HANDLE COVER (GREEN)

## (undated) DEVICE — VACUUM CURETTE BUSSE HOSP CURVED 8MM

## (undated) DEVICE — PREP BETADINE SPONGE STICKS